# Patient Record
Sex: MALE | Race: WHITE | HISPANIC OR LATINO | Employment: FULL TIME | URBAN - METROPOLITAN AREA
[De-identification: names, ages, dates, MRNs, and addresses within clinical notes are randomized per-mention and may not be internally consistent; named-entity substitution may affect disease eponyms.]

---

## 2017-08-08 ENCOUNTER — ALLSCRIPTS OFFICE VISIT (OUTPATIENT)
Dept: OTHER | Facility: OTHER | Age: 57
End: 2017-08-08

## 2017-08-08 DIAGNOSIS — N40.2 NODULAR PROSTATE WITHOUT LOWER URINARY TRACT SYMPTOMS: ICD-10-CM

## 2017-08-08 LAB
BILIRUB UR QL STRIP: NORMAL
CLARITY UR: NORMAL
COLOR UR: YELLOW
GLUCOSE (HISTORICAL): NORMAL
HGB UR QL STRIP.AUTO: NORMAL
KETONES UR STRIP-MCNC: NORMAL MG/DL
LEUKOCYTE ESTERASE UR QL STRIP: NORMAL
NITRITE UR QL STRIP: NORMAL
PH UR STRIP.AUTO: 5.5 [PH]
PROT UR STRIP-MCNC: NORMAL MG/DL
SP GR UR STRIP.AUTO: 1.02
UROBILINOGEN UR QL STRIP.AUTO: 0.2

## 2017-09-19 ENCOUNTER — ALLSCRIPTS OFFICE VISIT (OUTPATIENT)
Dept: OTHER | Facility: OTHER | Age: 57
End: 2017-09-19

## 2017-09-19 DIAGNOSIS — N41.0 ACUTE PROSTATITIS: ICD-10-CM

## 2017-09-19 LAB
BILIRUB UR QL STRIP: NEGATIVE
CLARITY UR: NORMAL
COLOR UR: YELLOW
GLUCOSE (HISTORICAL): NEGATIVE
HGB UR QL STRIP.AUTO: NEGATIVE
KETONES UR STRIP-MCNC: NEGATIVE MG/DL
LEUKOCYTE ESTERASE UR QL STRIP: NEGATIVE
NITRITE UR QL STRIP: NEGATIVE
PH UR STRIP.AUTO: 5.5 [PH]
PROT UR STRIP-MCNC: NEGATIVE MG/DL
SP GR UR STRIP.AUTO: 1.01
UROBILINOGEN UR QL STRIP.AUTO: 0.2

## 2018-01-13 VITALS
WEIGHT: 159 LBS | SYSTOLIC BLOOD PRESSURE: 122 MMHG | BODY MASS INDEX: 25.55 KG/M2 | DIASTOLIC BLOOD PRESSURE: 60 MMHG | HEIGHT: 66 IN

## 2018-01-14 VITALS
BODY MASS INDEX: 25.07 KG/M2 | HEIGHT: 66 IN | WEIGHT: 156 LBS | DIASTOLIC BLOOD PRESSURE: 77 MMHG | SYSTOLIC BLOOD PRESSURE: 115 MMHG

## 2018-07-03 RX ORDER — OMEPRAZOLE 20 MG/1
20 CAPSULE, DELAYED RELEASE ORAL
COMMUNITY
Start: 2018-06-01 | End: 2019-06-01

## 2018-07-03 RX ORDER — TAMSULOSIN HYDROCHLORIDE 0.4 MG/1
1 CAPSULE ORAL
COMMUNITY
Start: 2017-08-08 | End: 2018-07-06 | Stop reason: SINTOL

## 2018-07-03 RX ORDER — CLOBETASOL PROPIONATE 0.5 MG/G
CREAM TOPICAL
COMMUNITY

## 2018-07-06 ENCOUNTER — OFFICE VISIT (OUTPATIENT)
Dept: UROLOGY | Facility: MEDICAL CENTER | Age: 58
End: 2018-07-06
Payer: COMMERCIAL

## 2018-07-06 VITALS
BODY MASS INDEX: 26.2 KG/M2 | DIASTOLIC BLOOD PRESSURE: 66 MMHG | HEIGHT: 66 IN | WEIGHT: 163 LBS | SYSTOLIC BLOOD PRESSURE: 128 MMHG

## 2018-07-06 DIAGNOSIS — N13.8 BPH WITH OBSTRUCTION/LOWER URINARY TRACT SYMPTOMS: ICD-10-CM

## 2018-07-06 DIAGNOSIS — R10.30 LOWER ABDOMINAL PAIN: ICD-10-CM

## 2018-07-06 DIAGNOSIS — N40.0 BPH WITHOUT URINARY OBSTRUCTION: Primary | ICD-10-CM

## 2018-07-06 DIAGNOSIS — N40.1 BPH WITH OBSTRUCTION/LOWER URINARY TRACT SYMPTOMS: ICD-10-CM

## 2018-07-06 LAB
SL AMB  POCT GLUCOSE, UA: NEGATIVE
SL AMB LEUKOCYTE ESTERASE,UA: NEGATIVE
SL AMB POCT BILIRUBIN,UA: NEGATIVE
SL AMB POCT BLOOD,UA: NEGATIVE
SL AMB POCT CLARITY,UA: CLEAR
SL AMB POCT COLOR,UA: YELLOW
SL AMB POCT KETONES,UA: NEGATIVE
SL AMB POCT NITRITE,UA: NEGATIVE
SL AMB POCT PH,UA: 5.5
SL AMB POCT SPECIFIC GRAVITY,UA: >=1.03
SL AMB POCT URINE PROTEIN: NEGATIVE
SL AMB POCT UROBILINOGEN: 0.2

## 2018-07-06 PROCEDURE — 81003 URINALYSIS AUTO W/O SCOPE: CPT | Performed by: UROLOGY

## 2018-07-06 PROCEDURE — 99214 OFFICE O/P EST MOD 30 MIN: CPT | Performed by: UROLOGY

## 2018-07-06 RX ORDER — FINASTERIDE 5 MG/1
5 TABLET, FILM COATED ORAL DAILY
Qty: 30 TABLET | Refills: 11 | Status: SHIPPED | OUTPATIENT
Start: 2018-07-06 | End: 2019-07-20 | Stop reason: SDUPTHER

## 2018-07-06 NOTE — LETTER
2018     Stacie Cooley MD  [facility] [place] [address]    Patient: Harini Dobbins   YOB: 1960   Date of Visit: 2018       Dear Dr Ashvin Calhoun: Thank you for referring Harini Dobbins to me for evaluation  Below are my notes for this consultation  If you have questions, please do not hesitate to call me  I look forward to following your patient along with you  Sincerely,        Jarvis Salomon MD        CC: No Recipients  Jarvis Salomon MD  2018  9:22 AM  Sign at close encounter  [address] [facility]  [address], [address] [place] [phone]  www  [facility]  org      NAME: Eddi Betts  AGE: 63 y o  SEX: male  : 1960   MRN: [id number]    DATE: 2018  TIME: 8:59 AM    Assessment and Plan:  He describes pelvic pain in the form of bilateral groin pain, right greater than left, weak stream and urgency/ frequency  He also is describing problems with his legs when he gets to stand up in the morning in this may be related  I am going to order CT scan of the abdomen and pelvis and perform cystoscopy  There may be a neurogenic or orthopedic component to this  For his BPH with obstruction, and he does have an enlarged prostate with a median lobe component, we will prescribe Proscar 5 mg daily  I told him of the possibility of sexual side effects, although  these are extremely small  Follow-up approximately 4 weeks for cystoscopy and get CT scan in the meantime  Chief Complaint   No chief complaint on file  History of Present Illness   Established patient:  Here for follow-up of  " prostate nodule "Found on Renal and bladder US for bilateral , then confirmed as" suspicious for neoplasm" midline nonenhancing nodule 1,6cm No PIRADDS # assigned  No MARY done or PSA per pt prior to that first visit    I did a prostate exam last year which revealed a 60 g gland with no nodules  Brenda Wolff MRI reviwed by me personally-large prostate with median lobe, I see only BPH  PSA 2 1, T=334  I had ordered a renal ultrasound which does not look like it is been done, and I treated him for acute prostatitis last visit with Cipro  Cipro did not help either  BPH with onstruction-Eddi Betts is a 65year-old male established patient who is here for symptoms of enlarged prostate slow stream, nocturia q1-2hrs  Started 1 5 yrs ago  Also gets some left lower quadrant pain, which he has had at times for 8 years- was told he may have diverticulitis  has voiding discomfort at night as well  Flomax didn't help- has pain with voiding  Voiding frequently wth weak stream     Pelvic pain- as above He is worse than he was before- has bilateral groin pain, right worse than left, and is voiding frequently, small amounts  Cannot have relations with his wife due to this  The following portions of the patient's history were reviewed and updated as appropriate: allergies, current medications, past family history, past medical history, past social history, past surgical history and problem list     Review of Systems   Review of Systems   Genitourinary: Positive for difficulty urinating  Active Problem List   There is no problem list on file for this patient  Objective   There were no vitals taken for this visit  Physical Exam   Constitutional: He is oriented to person, place, and time  He appears well-developed and well-nourished  HENT:   Head: Normocephalic and atraumatic  Eyes: EOM are normal    Neck: Normal range of motion  Pulmonary/Chest: Effort normal    Musculoskeletal: Normal range of motion  Neurological: He is alert and oriented to person, place, and time  Skin: Skin is warm and dry  Psychiatric: He has a normal mood and affect   His behavior is normal  Judgment and thought content normal            Current Medications     Current Outpatient Prescriptions:     omeprazole (PRILOSEC) 20 mg delayed release capsule, Take 20 mg by mouth, Disp: , Rfl:     tamsulosin (FLOMAX) 0 4 mg, Take 1 capsule by mouth, Disp: , Rfl:     clobetasol (TEMOVATE) 0 05 % cream, clobetasol 0 05 % topical cream  APPLY A THIN LAYER TO THE AFFECTED AREA(S) BY TOPICAL ROUTE 2 TIMES PER DAY, Disp: , Rfl:         Luis Noonan MD

## 2018-07-06 NOTE — PROGRESS NOTES
[address] [facility]  [facility]  [address] [facility] [place]  [place], [address] [facility]  [phone]  www  [facility]  org      NAME: Eddi Betts  AGE: 63 y o  SEX: male  : 1960   MRN: [id number]    DATE: 2018  TIME: 8:59 AM    Assessment and Plan:  He describes pelvic pain in the form of bilateral groin pain, right greater than left, weak stream and urgency/ frequency  He also is describing problems with his legs when he gets to stand up in the morning in this may be related  I am going to order CT scan of the abdomen and pelvis and perform cystoscopy  There may be a neurogenic or orthopedic component to this  For his BPH with obstruction, and he does have an enlarged prostate with a median lobe component, we will prescribe Proscar 5 mg daily  I told him of the possibility of sexual side effects, although  these are extremely small  Follow-up approximately 4 weeks for cystoscopy and get CT scan in the meantime  Chief Complaint   No chief complaint on file  History of Present Illness   Established patient:  Here for follow-up of  " prostate nodule "Found on Renal and bladder US for bilateral , then confirmed as" suspicious for neoplasm" midline nonenhancing nodule 1,6cm No PIRADDS # assigned  No MARY done or PSA per pt prior to that first visit  I did a prostate exam last year which revealed a 60 g gland with no nodules  Lan Grimaldo MRI reviwed by me personally-large prostate with median lobe, I see only BPH  PSA 2 1, T=334  I had ordered a renal ultrasound which does not look like it is been done, and I treated him for acute prostatitis last visit with Cipro  Cipro did not help either  BPH with onstruction-Eddi Betts is a 65year-old male established patient who is here for symptoms of enlarged prostate slow stream, nocturia q1-2hrs  Started 1 5 yrs ago   Also gets some left lower quadrant pain, which he has had at times for 8 years- was told he may have diverticulitis  has voiding discomfort at night as well  Flomax didn't help- has pain with voiding  Voiding frequently wth weak stream     Pelvic pain- as above He is worse than he was before- has bilateral groin pain, right worse than left, and is voiding frequently, small amounts  Cannot have relations with his wife due to this  The following portions of the patient's history were reviewed and updated as appropriate: allergies, current medications, past family history, past medical history, past social history, past surgical history and problem list     Review of Systems   Review of Systems   Genitourinary: Positive for difficulty urinating  Active Problem List   There is no problem list on file for this patient  Objective   There were no vitals taken for this visit  Physical Exam   Constitutional: He is oriented to person, place, and time  He appears well-developed and well-nourished  HENT:   Head: Normocephalic and atraumatic  Eyes: EOM are normal    Neck: Normal range of motion  Pulmonary/Chest: Effort normal    Musculoskeletal: Normal range of motion  Neurological: He is alert and oriented to person, place, and time  Skin: Skin is warm and dry  Psychiatric: He has a normal mood and affect   His behavior is normal  Judgment and thought content normal            Current Medications     Current Outpatient Prescriptions:     omeprazole (PRILOSEC) 20 mg delayed release capsule, Take 20 mg by mouth, Disp: , Rfl:     tamsulosin (FLOMAX) 0 4 mg, Take 1 capsule by mouth, Disp: , Rfl:     clobetasol (TEMOVATE) 0 05 % cream, clobetasol 0 05 % topical cream  APPLY A THIN LAYER TO THE AFFECTED AREA(S) BY TOPICAL ROUTE 2 TIMES PER DAY, Disp: , Rfl:         Zulma Hartley MD

## 2018-07-14 ENCOUNTER — HOSPITAL ENCOUNTER (OUTPATIENT)
Dept: CT IMAGING | Facility: HOSPITAL | Age: 58
Discharge: HOME | End: 2018-07-14
Attending: UROLOGY
Payer: COMMERCIAL

## 2018-07-14 DIAGNOSIS — R10.30 LOWER ABDOMINAL PAIN: ICD-10-CM

## 2018-07-14 PROCEDURE — 74178 CT ABD&PLV WO CNTR FLWD CNTR: CPT

## 2018-07-14 RX ADMIN — IOHEXOL 100 ML: 350 INJECTION, SOLUTION INTRAVENOUS at 09:52

## 2018-07-31 RX ORDER — TRAMADOL HYDROCHLORIDE 50 MG/1
TABLET ORAL EVERY 12 HOURS
COMMUNITY
End: 2021-09-24

## 2018-07-31 RX ORDER — ATORVASTATIN CALCIUM 10 MG/1
10 TABLET, FILM COATED ORAL
COMMUNITY
Start: 2018-07-13 | End: 2021-09-24

## 2018-07-31 RX ORDER — IBUPROFEN 200 MG
950 CAPSULE ORAL
COMMUNITY
Start: 2018-07-13 | End: 2021-09-24

## 2018-07-31 RX ORDER — ERGOCALCIFEROL (VITAMIN D2) 1250 MCG
50000 CAPSULE ORAL
COMMUNITY
Start: 2018-07-13 | End: 2019-07-13

## 2018-07-31 RX ORDER — TAMSULOSIN HYDROCHLORIDE 0.4 MG/1
0.4 CAPSULE ORAL
COMMUNITY
End: 2019-02-07 | Stop reason: SDUPTHER

## 2018-08-03 ENCOUNTER — PROCEDURE VISIT (OUTPATIENT)
Dept: UROLOGY | Facility: MEDICAL CENTER | Age: 58
End: 2018-08-03
Payer: COMMERCIAL

## 2018-08-03 VITALS
SYSTOLIC BLOOD PRESSURE: 110 MMHG | WEIGHT: 162 LBS | BODY MASS INDEX: 26.03 KG/M2 | DIASTOLIC BLOOD PRESSURE: 76 MMHG | HEIGHT: 66 IN

## 2018-08-03 DIAGNOSIS — R10.30 LOWER ABDOMINAL PAIN: Primary | ICD-10-CM

## 2018-08-03 DIAGNOSIS — N13.8 BPH WITH OBSTRUCTION/LOWER URINARY TRACT SYMPTOMS: ICD-10-CM

## 2018-08-03 DIAGNOSIS — N40.1 BPH WITH OBSTRUCTION/LOWER URINARY TRACT SYMPTOMS: ICD-10-CM

## 2018-08-03 LAB
SL AMB  POCT GLUCOSE, UA: NEGATIVE
SL AMB LEUKOCYTE ESTERASE,UA: NEGATIVE
SL AMB POCT BILIRUBIN,UA: NEGATIVE
SL AMB POCT BLOOD,UA: NEGATIVE
SL AMB POCT CLARITY,UA: CLEAR
SL AMB POCT COLOR,UA: YELLOW
SL AMB POCT KETONES,UA: NEGATIVE
SL AMB POCT NITRITE,UA: NEGATIVE
SL AMB POCT PH,UA: 5.5
SL AMB POCT SPECIFIC GRAVITY,UA: 1.02
SL AMB POCT URINE PROTEIN: NEGATIVE
SL AMB POCT UROBILINOGEN: 0.2

## 2018-08-03 PROCEDURE — 99212 OFFICE O/P EST SF 10 MIN: CPT | Performed by: UROLOGY

## 2018-08-03 PROCEDURE — 81003 URINALYSIS AUTO W/O SCOPE: CPT | Performed by: UROLOGY

## 2018-08-03 PROCEDURE — 52000 CYSTOURETHROSCOPY: CPT | Performed by: UROLOGY

## 2018-08-03 RX ORDER — TAMSULOSIN HYDROCHLORIDE 0.4 MG/1
0.4 CAPSULE ORAL
Qty: 90 CAPSULE | Refills: 3 | Status: SHIPPED | OUTPATIENT
Start: 2018-08-03 | End: 2019-02-07 | Stop reason: SDUPTHER

## 2018-08-03 NOTE — PROGRESS NOTES
[address] [facility]  [facility]  [address] [facility] [place]  [place], [address] [facility]  [phone]  www  [facility]  org      NAME: Eddi Betts  AGE: 63 y o  SEX: male  : 1960   MRN: [id number]    DATE: 8/3/2018  TIME: 10:47 AM    Assessment and Plan               Chief Complaint     Chief Complaint   Patient presents with    Cystoscopy       History of Present Illness   Here for cystoscopy due to weak stream and urgency and frequency  CT scan is negative  The following portions of the patient's history were reviewed and updated as appropriate: allergies, current medications, past family history, past medical history, past social history, past surgical history and problem list     Review of Systems   Review of Systems    Active Problem List   There is no problem list on file for this patient        Objective   /76   Ht 5' 6" (1 676 m)   Wt 73 5 kg (162 lb)   BMI 26 15 kg/m²     Physical Exam        Current Medications     Current Outpatient Prescriptions:     atorvastatin (LIPITOR) 10 mg tablet, Take 10 mg by mouth, Disp: , Rfl:     calcium citrate (CALCITRATE) 950 MG tablet, Take 950 mg by mouth, Disp: , Rfl:     clobetasol (TEMOVATE) 0 05 % cream, clobetasol 0 05 % topical cream  APPLY A THIN LAYER TO THE AFFECTED AREA(S) BY TOPICAL ROUTE 2 TIMES PER DAY, Disp: , Rfl:     finasteride (PROSCAR) 5 mg tablet, Take 1 tablet (5 mg total) by mouth daily, Disp: 30 tablet, Rfl: 11    omeprazole (PRILOSEC) 20 mg delayed release capsule, Take 20 mg by mouth, Disp: , Rfl:     traMADol (ULTRAM) 50 mg tablet, Every 12 hours, Disp: , Rfl:     ergocalciferol (ERGOCALCIFEROL) 26291 units capsule, Take 50,000 Units by mouth, Disp: , Rfl:     tamsulosin (FLOMAX) 0 4 mg, Take 0 4 mg by mouth, Disp: , Rfl:         Karthikeyan Dangelo MD

## 2018-08-03 NOTE — LETTER
August 3, 2018     Ryan Natarajan MD  [facility]  [facility] [address]    Patient: Alyson Locke   YOB: 1960   Date of Visit: 8/3/2018       Dear Dr Minerva Mary: Thank you for referring Alyson Ip to me for evaluation  Below are my notes for this consultation  If you have questions, please do not hesitate to call me  I look forward to following your patient along with you           Sincerely,        Rikki Hernandez MD        CC: No Recipients

## 2018-08-06 ENCOUNTER — TELEPHONE (OUTPATIENT)
Dept: UROLOGY | Facility: MEDICAL CENTER | Age: 58
End: 2018-08-06

## 2018-08-06 NOTE — TELEPHONE ENCOUNTER
Patient's stepdaughter called  Patient had a reaction to Tamsulosin 0 4 mg  He got a very bad headache and had difficulty breathing, so he stopped it  His last dosage was Saturday  Patient is requesting a different medication  He only speaks [place]  If someone speaks [place], please call him at [phone]  If not, please call Soco (his stepdaughter) at [phone]

## 2018-08-07 NOTE — TELEPHONE ENCOUNTER
Tell him to continue with the Proscar-it will take several weeks for a to begin taking effect but that will help him

## 2018-08-07 NOTE — PROGRESS NOTES
Addendum to patient's visit 8/3/2018-cystoscopy was performed at that time-  Cystoscopy Procedure Note        Pre-operative Diagnosis:  BPH with obstruction and lower urinary tract symptoms    Post-operative Diagnosis:  Overactive bladder, mild BPH    Procedure: Flexible cystoscopy    Surgeon: Elvis Arce MD    Anesthesia: 1% Xylocaine per urethra    EBL: Minimal    Complications: none    Procedure Details   The risks, benefits, complications, treatment options, and expected outcomes were discussed with the patient  The patient concurred with the proposed plan, giving informed consent  Cystoscopy was performed today under local anesthesia, using sterile technique  The patient was placed in the supine position, prepped with Betadine, and draped in the usual sterile fashion  The flexible cystocope was used to inspect both the urethra and bladder    Findings:  Urethra:  Normal without stricture  Prostate shows mild-to-moderate obstruction  Bladder:  Smooth, not trabeculated and there were no stones tumors or other lesions  The orifices were orthotopic and intact  Specimens:  None                 Complications:  None           Disposition: To home     Plan:  Start Proscar 5 mg daily because he does not tolerate the Flomax very well  Follow-up in 6 months to reassess             Condition:  Stable

## 2018-08-08 DIAGNOSIS — N13.8 BPH WITH OBSTRUCTION/LOWER URINARY TRACT SYMPTOMS: Primary | ICD-10-CM

## 2018-08-08 DIAGNOSIS — N40.1 BPH WITH OBSTRUCTION/LOWER URINARY TRACT SYMPTOMS: Primary | ICD-10-CM

## 2018-08-08 RX ORDER — SILODOSIN 8 MG/1
8 CAPSULE ORAL DAILY
Qty: 30 CAPSULE | Refills: 11 | Status: SHIPPED | OUTPATIENT
Start: 2018-08-08 | End: 2019-01-08 | Stop reason: ALTCHOICE

## 2018-08-08 NOTE — TELEPHONE ENCOUNTER
Spoke with step daughter and gave her message  Wife would like to know if he can go back on Rapaflo also  Sending to Dr Sylvia Morlaes for orders  Thanks

## 2018-08-08 NOTE — TELEPHONE ENCOUNTER
Spoke with step-daughter who said life would like pt to have RX for Rapaflo sent to pharmacy in computer  Thanks

## 2018-12-31 ENCOUNTER — TELEPHONE (OUTPATIENT)
Dept: UROLOGY | Facility: AMBULATORY SURGERY CENTER | Age: 58
End: 2018-12-31

## 2018-12-31 NOTE — TELEPHONE ENCOUNTER
Spoke with wife who said pt is c/o supra pubic pain and pain in feet  Told her he should go to ER if in a lot of pain as the note says  She said he can wait to see Dr Pooja Adams next available next week  Appt given at that time

## 2019-01-08 ENCOUNTER — OFFICE VISIT (OUTPATIENT)
Dept: UROLOGY | Facility: MEDICAL CENTER | Age: 59
End: 2019-01-08
Payer: COMMERCIAL

## 2019-01-08 VITALS
HEIGHT: 66 IN | BODY MASS INDEX: 25.55 KG/M2 | SYSTOLIC BLOOD PRESSURE: 120 MMHG | DIASTOLIC BLOOD PRESSURE: 70 MMHG | WEIGHT: 159 LBS | HEART RATE: 60 BPM

## 2019-01-08 DIAGNOSIS — N52.1 ERECTILE DYSFUNCTION DUE TO DISEASES CLASSIFIED ELSEWHERE: ICD-10-CM

## 2019-01-08 DIAGNOSIS — N13.8 BPH WITH OBSTRUCTION/LOWER URINARY TRACT SYMPTOMS: ICD-10-CM

## 2019-01-08 DIAGNOSIS — N40.1 BPH WITH OBSTRUCTION/LOWER URINARY TRACT SYMPTOMS: ICD-10-CM

## 2019-01-08 DIAGNOSIS — R10.2 PELVIC PAIN: Primary | ICD-10-CM

## 2019-01-08 PROCEDURE — 99214 OFFICE O/P EST MOD 30 MIN: CPT | Performed by: UROLOGY

## 2019-01-08 RX ORDER — TAMSULOSIN HYDROCHLORIDE 0.4 MG/1
0.4 CAPSULE ORAL
Qty: 30 CAPSULE | Refills: 11 | Status: SHIPPED | OUTPATIENT
Start: 2019-01-08 | End: 2020-01-22

## 2019-01-08 RX ORDER — MELOXICAM 7.5 MG/1
TABLET ORAL
COMMUNITY

## 2019-01-08 RX ORDER — SILDENAFIL CITRATE 20 MG/1
20 TABLET ORAL DAILY PRN
Qty: 90 TABLET | Refills: 3 | Status: SHIPPED | OUTPATIENT
Start: 2019-01-08 | End: 2020-12-08 | Stop reason: DRUGHIGH

## 2019-01-08 NOTE — LETTER
2019     Prachi Bearden MD [address]  [facility] [address]    Patient: Shanna Flores   YOB: 1960   Date of Visit: 2019       Dear Dr Nancy Bowers: Thank you for referring Shanna Flores to me for evaluation  Below are my notes for this consultation  If you have questions, please do not hesitate to call me  I look forward to following your patient along with you  Sincerely,        Peewee Jean MD        CC: No Recipients  Peewee Jean MD  2019  9:23 AM  Sign at close encounter  [address] [facility]  [address], [address] [place] [phone]  www  [facility]  org      NAME: Eddi Betts  AGE: 63 y o  SEX: male  : 1960   MRN: [id number]    DATE: 2019  TIME: 8:30 AM    Assessment and Plan:    Chronic abdominal pain, for which he has seen Gastroenterology in the past and I have no easy answer for  BPH with obstruction:  Continue with Rapaflo and Proscar  He would like to try Flomax instead of the Rapaflo  We will prescribe this  Erectile dysfunction:  Prescribe sildenafil to mail order pharmacy  Instructions given  Re-evaluate in 3 months  Chief Complaint   No chief complaint on file  History of Present Illness   Here for follow-up of pelvic pain in the form of bilateral groin pain right greater than left weak stream and urgency and frequency  Since I last saw him he requested to go on Rapaflo we prescribed this for him  Cystoscopy showed a tight bladder neck and some BPH with large median lobe  CT scan was negative  He complains of left lower quadrant pain radiating over to his right testicle  Erectile dysfunction:  He would like to try Viagra      The following portions of the patient's history were reviewed and updated as appropriate: allergies, current medications, past family history, past medical history, past social history, past surgical history and problem list     Review of Systems   Review of Systems    Active Problem List   There is no problem list on file for this patient  Objective   There were no vitals taken for this visit      Physical Exam        Current Medications     Current Outpatient Prescriptions:     atorvastatin (LIPITOR) 10 mg tablet, Take 10 mg by mouth, Disp: , Rfl:     calcium citrate (CALCITRATE) 950 MG tablet, Take 950 mg by mouth, Disp: , Rfl:     clobetasol (TEMOVATE) 0 05 % cream, clobetasol 0 05 % topical cream  APPLY A THIN LAYER TO THE AFFECTED AREA(S) BY TOPICAL ROUTE 2 TIMES PER DAY, Disp: , Rfl:     ergocalciferol (ERGOCALCIFEROL) 74293 units capsule, Take 50,000 Units by mouth, Disp: , Rfl:     finasteride (PROSCAR) 5 mg tablet, Take 1 tablet (5 mg total) by mouth daily, Disp: 30 tablet, Rfl: 11    omeprazole (PRILOSEC) 20 mg delayed release capsule, Take 20 mg by mouth, Disp: , Rfl:     Silodosin (RAPAFLO) 8 MG CAPS, Take 1 capsule by mouth daily, Disp: 30 capsule, Rfl: 11    tamsulosin (FLOMAX) 0 4 mg, Take 0 4 mg by mouth, Disp: , Rfl:     tamsulosin (FLOMAX) 0 4 mg, Take 1 capsule (0 4 mg total) by mouth daily with dinner, Disp: 90 capsule, Rfl: 3    traMADol (ULTRAM) 50 mg tablet, Every 12 hours, Disp: , Rfl:         Sophie Keller MD

## 2019-01-08 NOTE — PROGRESS NOTES
[address] [facility]  [facility]  [address] [facility], [address] [place] [address]  [phone]  www  [facility]  org      NAME: Eddi Betts  AGE: 63 y o  SEX: male  : 1960   MRN: [id number]    DATE: 2019  TIME: 8:30 AM    Assessment and Plan:    Chronic abdominal pain, for which he has seen Gastroenterology in the past and I have no easy answer for  BPH with obstruction:  Continue with Rapaflo and Proscar  He would like to try Flomax instead of the Rapaflo  We will prescribe this  Erectile dysfunction:  Prescribe sildenafil to mail order pharmacy  Instructions given  Re-evaluate in 3 months  Chief Complaint   No chief complaint on file  History of Present Illness   Here for follow-up of pelvic pain in the form of bilateral groin pain right greater than left weak stream and urgency and frequency  Since I last saw him he requested to go on Rapaflo we prescribed this for him  Cystoscopy showed a tight bladder neck and some BPH with large median lobe  CT scan was negative  He complains of left lower quadrant pain radiating over to his right testicle  Erectile dysfunction:  He would like to try Viagra  The following portions of the patient's history were reviewed and updated as appropriate: allergies, current medications, past family history, past medical history, past social history, past surgical history and problem list     Review of Systems   Review of Systems   Genitourinary: Negative  Active Problem List   There is no problem list on file for this patient  Objective   There were no vitals taken for this visit  Physical Exam   Constitutional: He is oriented to person, place, and time  He appears well-developed and well-nourished  HENT:   Head: Normocephalic and atraumatic  Eyes: EOM are normal    Neck: Normal range of motion     Pulmonary/Chest: Effort normal    Musculoskeletal: Normal range of motion  Neurological: He is alert and oriented to person, place, and time  Skin: Skin is warm and dry  Psychiatric: He has a normal mood and affect   His behavior is normal  Judgment and thought content normal            Current Medications     Current Outpatient Prescriptions:     atorvastatin (LIPITOR) 10 mg tablet, Take 10 mg by mouth, Disp: , Rfl:     calcium citrate (CALCITRATE) 950 MG tablet, Take 950 mg by mouth, Disp: , Rfl:     clobetasol (TEMOVATE) 0 05 % cream, clobetasol 0 05 % topical cream  APPLY A THIN LAYER TO THE AFFECTED AREA(S) BY TOPICAL ROUTE 2 TIMES PER DAY, Disp: , Rfl:     ergocalciferol (ERGOCALCIFEROL) 32742 units capsule, Take 50,000 Units by mouth, Disp: , Rfl:     finasteride (PROSCAR) 5 mg tablet, Take 1 tablet (5 mg total) by mouth daily, Disp: 30 tablet, Rfl: 11    omeprazole (PRILOSEC) 20 mg delayed release capsule, Take 20 mg by mouth, Disp: , Rfl:     Silodosin (RAPAFLO) 8 MG CAPS, Take 1 capsule by mouth daily, Disp: 30 capsule, Rfl: 11    tamsulosin (FLOMAX) 0 4 mg, Take 0 4 mg by mouth, Disp: , Rfl:     tamsulosin (FLOMAX) 0 4 mg, Take 1 capsule (0 4 mg total) by mouth daily with dinner, Disp: 90 capsule, Rfl: 3    traMADol (ULTRAM) 50 mg tablet, Every 12 hours, Disp: , Rfl:         Ashish Brand MD

## 2019-04-08 ENCOUNTER — OFFICE VISIT (OUTPATIENT)
Dept: UROLOGY | Facility: MEDICAL CENTER | Age: 59
End: 2019-04-08
Payer: COMMERCIAL

## 2019-04-08 VITALS
HEIGHT: 66 IN | BODY MASS INDEX: 25.55 KG/M2 | SYSTOLIC BLOOD PRESSURE: 120 MMHG | WEIGHT: 159 LBS | DIASTOLIC BLOOD PRESSURE: 80 MMHG | HEART RATE: 77 BPM

## 2019-04-08 DIAGNOSIS — N40.1 BPH WITH OBSTRUCTION/LOWER URINARY TRACT SYMPTOMS: Primary | ICD-10-CM

## 2019-04-08 DIAGNOSIS — N13.8 BPH WITH OBSTRUCTION/LOWER URINARY TRACT SYMPTOMS: Primary | ICD-10-CM

## 2019-04-08 DIAGNOSIS — Z12.5 ENCOUNTER FOR PROSTATE CANCER SCREENING: ICD-10-CM

## 2019-04-08 DIAGNOSIS — N52.9 ERECTILE DYSFUNCTION, UNSPECIFIED ERECTILE DYSFUNCTION TYPE: ICD-10-CM

## 2019-04-08 LAB
SL AMB  POCT GLUCOSE, UA: NORMAL
SL AMB LEUKOCYTE ESTERASE,UA: NORMAL
SL AMB POCT BILIRUBIN,UA: NORMAL
SL AMB POCT BLOOD,UA: NORMAL
SL AMB POCT CLARITY,UA: CLEAR
SL AMB POCT COLOR,UA: YELLOW
SL AMB POCT KETONES,UA: NORMAL
SL AMB POCT NITRITE,UA: NORMAL
SL AMB POCT PH,UA: 5.5
SL AMB POCT SPECIFIC GRAVITY,UA: 1.02
SL AMB POCT URINE PROTEIN: NORMAL
SL AMB POCT UROBILINOGEN: 0.2

## 2019-04-08 PROCEDURE — 99213 OFFICE O/P EST LOW 20 MIN: CPT | Performed by: UROLOGY

## 2019-04-08 PROCEDURE — 81003 URINALYSIS AUTO W/O SCOPE: CPT | Performed by: UROLOGY

## 2019-04-08 RX ORDER — SILDENAFIL 50 MG/1
100 TABLET, FILM COATED ORAL DAILY PRN
Qty: 20 TABLET | Refills: 3 | Status: SHIPPED | OUTPATIENT
Start: 2019-04-08

## 2019-07-20 DIAGNOSIS — N40.1 BPH WITH OBSTRUCTION/LOWER URINARY TRACT SYMPTOMS: ICD-10-CM

## 2019-07-20 DIAGNOSIS — N13.8 BPH WITH OBSTRUCTION/LOWER URINARY TRACT SYMPTOMS: ICD-10-CM

## 2019-07-22 RX ORDER — FINASTERIDE 5 MG/1
TABLET, FILM COATED ORAL
Qty: 30 TABLET | Refills: 10 | Status: SHIPPED | OUTPATIENT
Start: 2019-07-22 | End: 2020-05-15 | Stop reason: SDUPTHER

## 2020-01-22 DIAGNOSIS — N40.1 BPH WITH OBSTRUCTION/LOWER URINARY TRACT SYMPTOMS: ICD-10-CM

## 2020-01-22 DIAGNOSIS — N13.8 BPH WITH OBSTRUCTION/LOWER URINARY TRACT SYMPTOMS: ICD-10-CM

## 2020-01-22 RX ORDER — TAMSULOSIN HYDROCHLORIDE 0.4 MG/1
CAPSULE ORAL
Qty: 30 CAPSULE | Refills: 0 | Status: SHIPPED | OUTPATIENT
Start: 2020-01-22 | End: 2020-05-20 | Stop reason: SDUPTHER

## 2020-05-15 DIAGNOSIS — N40.1 BPH WITH OBSTRUCTION/LOWER URINARY TRACT SYMPTOMS: ICD-10-CM

## 2020-05-15 DIAGNOSIS — N13.8 BPH WITH OBSTRUCTION/LOWER URINARY TRACT SYMPTOMS: ICD-10-CM

## 2020-05-15 RX ORDER — FINASTERIDE 5 MG/1
5 TABLET, FILM COATED ORAL DAILY
Qty: 90 TABLET | Refills: 0 | Status: SHIPPED | OUTPATIENT
Start: 2020-05-15 | End: 2021-05-11 | Stop reason: SDUPTHER

## 2020-05-20 RX ORDER — TAMSULOSIN HYDROCHLORIDE 0.4 MG/1
0.4 CAPSULE ORAL
Qty: 90 CAPSULE | Refills: 0 | Status: SHIPPED | OUTPATIENT
Start: 2020-05-20 | End: 2020-09-04 | Stop reason: SDUPTHER

## 2020-09-04 DIAGNOSIS — N13.8 BPH WITH OBSTRUCTION/LOWER URINARY TRACT SYMPTOMS: ICD-10-CM

## 2020-09-04 DIAGNOSIS — N40.1 BPH WITH OBSTRUCTION/LOWER URINARY TRACT SYMPTOMS: ICD-10-CM

## 2020-09-04 RX ORDER — TAMSULOSIN HYDROCHLORIDE 0.4 MG/1
0.4 CAPSULE ORAL
Qty: 90 CAPSULE | Refills: 0 | Status: SHIPPED | OUTPATIENT
Start: 2020-09-04 | End: 2020-12-08 | Stop reason: SDUPTHER

## 2020-09-04 NOTE — TELEPHONE ENCOUNTER
Medication Refill Request [facility]    Patient calling to request a refill of tamsulosin (FLOMAX) 0 4 mg    to be sent to: [address] & [facility], [address] [facility]  Phone:  [phone]    Patient can be reached at: [phone]

## 2020-09-04 NOTE — TELEPHONE ENCOUNTER
The patient has an upcoming office visit scheduled for 9/28/20 with Dr Maxwell Sandoval in the [facility] location but will run out of medication until then    Request for same, 90 day supply with NO refills was queued and forwarded to the Advanced Practitioner covering the [facility] location for approval

## 2020-09-18 ENCOUNTER — TELEPHONE (OUTPATIENT)
Dept: UROLOGY | Facility: MEDICAL CENTER | Age: 60
End: 2020-09-18

## 2020-12-07 DIAGNOSIS — N40.1 BPH WITH OBSTRUCTION/LOWER URINARY TRACT SYMPTOMS: ICD-10-CM

## 2020-12-07 DIAGNOSIS — N13.8 BPH WITH OBSTRUCTION/LOWER URINARY TRACT SYMPTOMS: ICD-10-CM

## 2020-12-08 RX ORDER — TAMSULOSIN HYDROCHLORIDE 0.4 MG/1
0.4 CAPSULE ORAL
Qty: 90 CAPSULE | Refills: 0 | Status: SHIPPED | OUTPATIENT
Start: 2020-12-08 | End: 2021-05-11

## 2021-01-25 ENCOUNTER — TELEPHONE (OUTPATIENT)
Dept: UROLOGY | Facility: MEDICAL CENTER | Age: 61
End: 2021-01-25

## 2021-02-02 ENCOUNTER — TELEPHONE (OUTPATIENT)
Dept: UROLOGY | Facility: MEDICAL CENTER | Age: 61
End: 2021-02-02

## 2021-02-02 NOTE — TELEPHONE ENCOUNTER
Call placed to patient and voicemail left for patient to make him aware that his visit will need to be switched to virtual with Dr Garcia today  Office contact information left for patient to call our office back and confirm

## 2021-02-10 ENCOUNTER — TELEPHONE (OUTPATIENT)
Dept: UROLOGY | Facility: MEDICAL CENTER | Age: 61
End: 2021-02-10

## 2021-02-10 ENCOUNTER — OFFICE VISIT (OUTPATIENT)
Dept: UROLOGY | Facility: MEDICAL CENTER | Age: 61
End: 2021-02-10
Payer: COMMERCIAL

## 2021-02-10 VITALS — HEIGHT: 66 IN | WEIGHT: 165 LBS | BODY MASS INDEX: 26.52 KG/M2

## 2021-02-10 DIAGNOSIS — N40.1 BPH WITH OBSTRUCTION/LOWER URINARY TRACT SYMPTOMS: Primary | ICD-10-CM

## 2021-02-10 DIAGNOSIS — N13.8 BPH WITH OBSTRUCTION/LOWER URINARY TRACT SYMPTOMS: Primary | ICD-10-CM

## 2021-02-10 DIAGNOSIS — N52.8 OTHER MALE ERECTILE DYSFUNCTION: ICD-10-CM

## 2021-02-10 DIAGNOSIS — Z71.89 OTHER SPECIFIED COUNSELING: ICD-10-CM

## 2021-02-10 LAB
SL AMB  POCT GLUCOSE, UA: NORMAL
SL AMB LEUKOCYTE ESTERASE,UA: NORMAL
SL AMB POCT BILIRUBIN,UA: NORMAL
SL AMB POCT BLOOD,UA: NORMAL
SL AMB POCT CLARITY,UA: CLEAR
SL AMB POCT COLOR,UA: YELLOW
SL AMB POCT KETONES,UA: NORMAL
SL AMB POCT NITRITE,UA: NORMAL
SL AMB POCT PH,UA: 6
SL AMB POCT SPECIFIC GRAVITY,UA: >=1.03
SL AMB POCT URINE PROTEIN: NORMAL
SL AMB POCT UROBILINOGEN: 0.2

## 2021-02-10 PROCEDURE — 81003 URINALYSIS AUTO W/O SCOPE: CPT | Performed by: UROLOGY

## 2021-02-10 PROCEDURE — 99203 OFFICE O/P NEW LOW 30 MIN: CPT | Performed by: UROLOGY

## 2021-02-10 RX ORDER — ALFUZOSIN HYDROCHLORIDE 10 MG/1
10 TABLET, EXTENDED RELEASE ORAL DAILY
Qty: 90 TABLET | Refills: 1 | Status: SHIPPED | OUTPATIENT
Start: 2021-02-10 | End: 2021-05-11

## 2021-02-10 NOTE — PROGRESS NOTES
Assessment/Plan:    Other male erectile dysfunction   Sildenafil is working  Continue it  BPH with obstruction/lower urinary tract symptoms   The patient has moderate- severe voiding symptoms despite taking tamsulosin and finasteride for some time  The tamsulosin is also giving him untoward side effects  At this point, we plan a cystoscopy since he may do much better       Diagnoses and all orders for this visit:    BPH with obstruction/lower urinary tract symptoms  -     POCT urine dip auto non-scope  -     alfuzosin (UROXATRAL) 10 mg 24 hr tablet; Take 1 tablet (10 mg total) by mouth daily    Other specified counseling    Other male erectile dysfunction          Subjective:      Patient ID: Maggie Richardson is a 62 y o  male  HPI     New patient visit  Translation provided by Stacy Lock  BPH:  He notes intermittency, weak stream and nocturia x 3  He denies other significant urinary symptoms  He denies gross hematuria, urinary tract infections or incontinence  He is taking tamsulosin and finasteride for his symptoms  He reports he does not feel well on tamsulosin and he does not think it is helping much  He is also having sexual problems form the meds  ElevatedPSA:  2 8 on December 21, 2020  The patient is taking finasteride, so his PSA should be less than 2  There are no other PSA's on record  PVR:  13 cc  AUA SYMPTOM SCORE      Most Recent Value   AUA SYMPTOM SCORE   How often have you had a sensation of not emptying your bladder completely after you finished urinating? 2   How often have you had to urinate again less than two hours after you finished urinating? 2   How often have you found you stopped and started again several times when you urinate? 3   How often have you found it difficult to postpone urination? 1   How often have you had a weak urinary stream?  2   How often have you had to push or strain to begin urination?   1   How many times did you most typically get up to urinate from the time you went to bed at night until the time you got up in the morning? 3   Quality of Life: If you were to spend the rest of your life with your urinary condition just the way it is now, how would you feel about that?  3   AUA SYMPTOM SCORE  14        Erectile dysfunction:  Sildenafil is working  Accompanied by wife  The following portions of the patient's history were reviewed and updated as appropriate: allergies, current medications, past family history, past medical history, past social history, past surgical history and problem list     Review of Systems   Constitutional: Negative for activity change and fatigue  Respiratory: Negative for shortness of breath and wheezing  Cardiovascular: Negative for chest pain  Gastrointestinal: Negative for abdominal pain  Genitourinary: Negative for difficulty urinating, dysuria, frequency, hematuria and urgency  Musculoskeletal: Negative for back pain and gait problem  Skin: Negative  Allergic/Immunologic: Negative  Neurological: Negative  Psychiatric/Behavioral: Negative  Objective:      Ht 5' 6" (1 676 m)   Wt 74 8 kg (165 lb)   BMI 26 63 kg/m²          Physical Exam  Constitutional:       Appearance: He is well-developed  HENT:      Head: Normocephalic and atraumatic  Neck:      Musculoskeletal: Normal range of motion and neck supple  Pulmonary:      Effort: Pulmonary effort is normal    Genitourinary:     Rectum: Normal       Comments: The prostate is 40 grams, firm, smooth and non-tender  Musculoskeletal: Normal range of motion  Skin:     General: Skin is warm and dry  Neurological:      Mental Status: He is alert and oriented to person, place, and time  Psychiatric:         Behavior: Behavior normal          Thought Content:  Thought content normal          Judgment: Judgment normal

## 2021-02-10 NOTE — ASSESSMENT & PLAN NOTE
The patient has moderate- severe voiding symptoms despite taking tamsulosin and finasteride for some time  The tamsulosin is also giving him untoward side effects    At this point, we plan a cystoscopy since he may do much better

## 2021-02-10 NOTE — TELEPHONE ENCOUNTER
Patient saw Dr Rand Goyal this morning  He would like the patient to see either Dr Marina Peña or Dr Moose Cantu in four weeks  Pt needs Niuean speaking MD who does urolift  Not sure where to put patient  He would prefer [facility] but will go to [facility] or [facility] if necessary  Thank you  If you need me to do anything else, let me know

## 2021-02-11 NOTE — TELEPHONE ENCOUNTER
Hi,    Can you please advise? Per note sent to me, Dr Harshil Eli would like the pt to see with Dr Mary Roque or Dr Marely Reddy (in 4wks for FU)  I checked both MD's schedule's and I did find an appt for the patient with Dr Marely Reddy on 3/19/21(5 wks), is this appt appropiate? Thank you!

## 2021-05-10 NOTE — PATIENT INSTRUCTIONS
Ayuda para la uriel de decisiones en casos de hiperplasia prostática benigna   LO QUE NECESITA SABER:   ¿Qué necesito saber acerca de la uriel de decisiones en los casos de hiperplasia prostática benigna (HPB)? Puede trabajar con eason médico para stephanie decisiones acerca de la detección o el tratamiento de la HPB  La detección es sweta prueba que se realiza para determinar la presencia de HPB de manera temprana  La detección es diferente del diagnóstico  La detección se [place] si usted está en riesgo incrementado de HPB panda no tiene síntomas  [place] implica que el control o el tratamiento pueden comenzar de Chio temprana  También puede ayudar a planificar el tratamiento si se determina la presencia de HPB en la detección o si esta se desarrolla luego  Silva opciones de tratamiento incluyen la cirugía y las opciones no quirúrgicas  Es probable que eason médico le recomiende los tratamientos no quirúrgicos starla  Conozca los beneficios y los riesgos de la [place] y del tratamiento no quirúrgico para que pueda stephanie sweta decisión informada  ¿Qué yo saber acerca de la HPB? · La HPB es un agrandamiento de la próstata  La próstata generalmente es del tamaño de Zigmund Jose Angel y envuelve la uretra  Un agrandamiento de la próstata hará que esta ejerza presión sobre la uretra  [place] puede causar problemas con el almacenamiento de la orina o la eliminación total de la orina  · La HPB es frecuente en hombres mayores de 40 años  El riesgo aumenta con la edad  · [place] significa que no es cáncer  La HPB no es sweta afección potencialmente mortal, panda puede causar problemas con sliva actividades diarias  La HPB generalmente empeora con el paso del [place]  Sin tratamiento, la HPB también puede provocar la presencia de alicia en la orina, cálculos en la vejiga o insuficiencia renal     ¿Soy un buen candidato para la detección de la HPB?  La detección puede ser útil para usted si algo de lo siguiente es verdadero:  · Tiene 36 años o más de edad     · Usted tiene antecedentes familiares de HPB u otros problemas de próstata  · Tiene sweta afección médica, jeanette sweta enfermedad cardíaca o diabetes tipo 2, o es ruthy  · No hace mucha actividad física  · Usted quiere recibir tratamiento tan pronto jeanette sea posible, si es necesario  · Uriel un medicamento betabloqueante  ¿Cómo se realiza la detección? Eason médico le preguntará sobre eason deja y silva antecedentes médicos y antecedentes familiares de problemas con la próstata  Si tiene mayor riesgo, eason médico puede usar cualquiera de los siguientes métodos para verificar la presencia de HPB:  · La Puntuación internacional de síntomas de la próstata (International Prostate Symptom Score) es un conjunto de preguntas sobre eason capacidad de orinar kayden el mes anterior  Se le preguntará con qué frecuencia presentó algo de lo que se detalla a continuación:     ? Orina 8 o más veces en un día    ? Sensación de no vaciar completamente la vejiga al orinar    ? Sweta necesidad urgente de orinar que no pudo posponer o de volver a orinar en un lapso de 2 horas    ? Despertarse debido a la necesidad de orinar    ? Problemas para comenzar a orinar o necesidad de empujar o esforzarse para comenzar a hacerlo    ? Flujo de orina que comienza y se detiene varias veces al orinar    ? [place] débil o goteo después de orinar    · Un tacto rectal se utiliza para comprobar el tamaño de la próstata  Eason médico introducirá un dedo cubierto con un guante en el recto  El médico podrá palpar la próstata  El examen se puede repetir con el paso del tiempo para verificar el tamaño de la próstata  · Un examen de APE se utiliza para determinar la cantidad de sweta proteína que la glándula prostática produce  Se uriel sweta muestra de alicia para stuart examen  Un nivel alto de APE puede aumentar el riesgo de padecer problemas más graves para orinar o la necesidad de someterse a [facility]      ¿Cuáles son Lavona Genin y los riesgos de la detección? Hable con esaon médico sobre los riesgos y los beneficios de la detección:  · Los beneficios incluyen el hallazgo de la HPB de Chio temprana  [place] significa que puede stephanie [employer] tratamientos  El examen de APE también puede detectar la presencia de cáncer de próstata de manera temprana  El tratamiento para el cáncer de próstata es más exitoso cuando se comienza de Chio temprana  · Los riesgos incluyen sweta falsa creencia de que no desarrollará HPB si el resultado de la detección es negativo  Aunque los resultados de la detección cory negativos, usted puede desarrollar HPB más adelante  También puede necesitar realizarse otros exámenes si tiene problemas para orinar, panda según la detección no tiene HPB  ¿Qué preguntas yo hacerle a mi médico para que me ayude a stephanie decisiones sobre la detección?  · ¿Qué riesgo presento de tener HPB? · ¿Con qué frecuencia yo realizarme la detección? · ¿Dónde se realiza la detección? · ¿Necesito hacer algo para estar listo para someterme a la detección?    ¿Qué sucede después de la detección? Usted se reunirá con eason médico para repasar los [employer] de la detección  [place] vez necesite más pruebas para diagnosticar cualquier problema que se haya observado en la detección  Las pruebas comunes incluyen un análisis de orina para detectar la presencia de infecciones o sweta biopsia (muestra de tejido) para detectar la presencia de cáncer  También puede necesitar exámenes para determinar la cantidad de orina que permanece en la vejiga después de orinar  Además, se puede medir la fuerza del flujo de la [facility]  Usted y eason médico pueden hablar acerca de silva opciones de [place]  Juntos pueden decidir qué tratamiento es adecuado para usted  ¿Cómo se trata la HPB? ¿Cuáles son los beneficios del tratamiento? · Conducta expectante significa que no recibirá tratamiento de inmediato   Se controlarán silva signos y síntomas con el paso del tiempo para chrissy si Rajendra   Le pueden solicitar que lleve un registro  El registro incluirá el momento en que orina, la dificultad que le presentó orinar y cualquier cambio que note en la micción  Ana Paula Joan registro con usted a las citas de control  Eason médico también puede recomendarle maneras de mejorar silva síntomas kayden la conducta expectante  · Los medicamentos pueden administrarse para ayudarlo a controlar silva síntomas y para evitar que la HPB empeore  Los medicamentos pueden ayudar a relajar ciertos músculos para hacer que orinar le resulte más fácil  También pueden necesitar medicamentos para hacer que la próstata se achique o para aliviar la hiperactividad de la vejiga  Los medicamentos pueden comenzar a aliviar los síntomas rápidamente  Estos podrían mejorar eason calidad de sung  Usted puede ser capaz de controlar silva síntomas sin cirugía si los medicamentos impiden que la HPB empeore  · Un procedimiento se puede utilizar para colocar un stent en la uretra y [place]  Un stent es un tubo de ivone corto y diminuto  Se puede utilizar un lifting uretral prostático, o UroLift, para mantener la próstata alejada de la [place] hace que la uretra sea más ancha para que la orina pueda pasar más fácilmente  · La cirugía puede realizarse para [employer] síntomas si otros tratamientos no [employer]  Sweta ablación es sweta cirugía en la que se utiliza sweta aguja para eliminar el tejido adicional que está causando los síntomas  O mary ann, puede usarse un láser para eliminar el tejido  La próstata puede calentarse  Se introduce sweta herramienta que emite calor en la uretra  Se destruye el tejido de la próstata, y ningún otro tejido sufre daños  [place] otro tipo de [place], se pueden extraer partes de la próstata  ¿Cuáles son los riesgos del tratamiento para la HPB? · Conducta expectante puede permitir que la HPB empeore y sea más difícil de tratar que en sweta etapa temprana   Si usted tiene un nivel alto de APE, es posible que deba recibir tratamiento para la HPB de inmediato  · Los medicamentos pueden provocar ciertos efectos secundarios, jeanette la disfunción eréctil o la disminución del deseo sexual  También puede desarrollar retención urinaria (problemas para comenzar a orinar)  Algunos medicamentos pueden causar hipotensión (presión sanguínea baja) cuando se pone de pie o mareos  · La cirugía puede dañar el tejido que se encuentra alrededor de la próstata  La cirugía también puede aumentar el riesgo de presentar dificultad para orinar, incontinencia (escapes) o retención [place]  Podría sangrar más de lo esperado kayden la cirugía o contraer sweta infección  También es posible que deba recibir tratamiento otra vez si la cirugía no katie silva síntomas  ¿Qué preguntas yo hacerle a mi médico para que me ayude a stephanie decisiones sobre el tratamiento? · ¿Cómo me sentiré si sigo teniendo estos síntomas kayden el miranda de mi sung? · ¿Qué medicamentos pueden trey mejores resultados para tratar mis síntomas? · ¿Qué otras medidas puedo stephanie para disminuir los síntomas? · ¿Voy a tener que stephanie medicamentos kayden el miranda de mi sung? · ¿Qué efectos secundarios podrían ocurrir con cada medicamento que puedo stephanie? · ¿Soy un buen candidato para la cirugía? · ¿Qué cirugía puede trey mejores resultados para tratar mis síntomas? · ¿Dónde se realiza la cirugía? · ¿Cuánto dura la recuperación tras la cirugía? · ¿Cuáles son Jillene Ramus? ACUERDOS SOBRE EASON CUIDADO:   Usted tiene el derecho de ayudar a planear eason cuidado  Aprenda todo lo que pueda sobre eason condición y jeanette darle tratamiento  Discuta silva opciones de tratamiento con silva médicos para decidir el cuidado que usted desea recibir  Usted siempre tiene el derecho de rechazar el tratamiento  Esta información es sólo para uso en educación   Eason intención no es darle un consejo médico sobre enfermedades o tratamientos  Colsulte con eason Jennifer Radhames farmacéutico antes de seguir cualquier régimen médico para saber si es seguro y efectivo para usted  © Copyright [address] [place] [address] Drive Information is for End User's use only and may not be sold, redistributed or otherwise used for commercial purposes   All illustrations and images included in CareNotes® are the copyrighted property of A D A M , Inc  or [address] [place] [address]

## 2021-05-10 NOTE — PROGRESS NOTES
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    Relevant Medications    finasteride (PROSCAR) 5 mg tablet    Silodosin 4 MG CAPS       Other    Pelvic pain    Person consulting for explanation of examination or test finding            Discussion:    I had a productive visit with the patient and his female  today  I counseled them on TURP, Uro lift, in simple prostatectomy  His cystoscopy does show lateral lobe hypertrophy with invasion of the prostate into the lumen of the bladder, more on the left relative to the right, this may make a Uro lift problematic in his particular case  I did give him a prescription for Rapaflo, upon attempting a transrectal ultrasound he screamed out in pain, a rectal examination shows extremely tight pelvic floor muscles, I have canceled the transrectal ultrasound due to patient intolerance, I have reschedule this with Xanax on board, this will give us the information needed to help to  the patient on either TURP or simple prostatectomy  I did tell him that pelvic pain is a difficult symptom to treat, however given that he does have obstructive urinary symptoms as well he may do better with this complaint after outlet surgery  Assessment and plan:       Please see problem oriented charting for the assessment plan of today's urological complaints      Araseli Coy MD      Chief Complaint     Chief Complaint   Patient presents with    Cystoscopy     TRUS    Benign Prostatic Hypertrophy     obstruction/luts         History of Present Illness     Kadeem Rosario is a 62 y o  Man seen previously by Dr Austin Conley for lower urinary tract symptoms, complains of intermittent stream, forcing to urinate, and pelvic pain  Also with some baseline erectile dysfunction taking tamsulosin  He has requested that we switch him back to Rapaflo which I have provided to him    He was not able to tolerate a transrectal ultrasound today due to pelvic floor spasm and pain, we will again attempt this with some Xanax on board some weeks from now  The following portions of the patient's history were reviewed and updated as appropriate: allergies, current medications, past family history, past medical history, past social history, past surgical history and problem list       Detailed Urologic History     - please refer to HPI    Review of Systems     Review of Systems   Constitutional: Negative  HENT: Negative  Eyes: Negative  Respiratory: Negative  Cardiovascular: Negative  Gastrointestinal: Negative  Endocrine: Negative  Genitourinary: Positive for difficulty urinating, frequency and urgency  And pelvic pain   Musculoskeletal: Negative  Skin: Negative  Allergic/Immunologic: Negative  Neurological: Negative  Hematological: Negative  Psychiatric/Behavioral: Negative  Allergies     No Known Allergies    Physical Exam     Physical Exam  Vitals signs reviewed  Constitutional:       General: He is not in acute distress  Appearance: Normal appearance  He is not ill-appearing, toxic-appearing or diaphoretic  HENT:      Head: Normocephalic and atraumatic  Eyes:      General: No scleral icterus  Right eye: No discharge  Left eye: No discharge  Cardiovascular:      Pulses: Normal pulses  Pulmonary:      Effort: Pulmonary effort is normal    Abdominal:      General: There is no distension  Palpations: There is no mass  Tenderness: There is no abdominal tenderness  Hernia: No hernia is present  Genitourinary:     Comments: Uncircumcised phallus, normal Ronnie stage, normal meatus, no penile plaques, normal scrotum, prostate is not palpable due to severe pelvic floor muscle spasm with pain  Musculoskeletal:         General: No swelling  Skin:     General: Skin is warm     Neurological:      General: No focal deficit present  Mental Status: He is alert and oriented to person, place, and time  Psychiatric:         Mood and Affect: Mood normal          Behavior: Behavior normal          Thought Content:  Thought content normal          Judgment: Judgment normal              Vital Signs  Vitals:    05/11/21 1405   BP: 132/88   BP Location: Left arm   Patient Position: Sitting   Cuff Size: Adult   Pulse: 70   Weight: 74 4 kg (164 lb)   Height: 5' 6" (1 676 m)         Current Medications       Current Outpatient Medications:     clobetasol (TEMOVATE) 0 05 % cream, clobetasol 0 05 % topical cream  APPLY A THIN LAYER TO THE AFFECTED AREA(S) BY TOPICAL ROUTE 2 TIMES PER DAY, Disp: , Rfl:     finasteride (PROSCAR) 5 mg tablet, Take 1 tablet (5 mg total) by mouth daily, Disp: 90 tablet, Rfl: 3    meloxicam (MOBIC) 7 5 mg tablet, meloxicam 7 5 mg tablet, Disp: , Rfl:     sildenafil (VIAGRA) 50 MG tablet, Take 2 tablets (100 mg total) by mouth daily as needed for erectile dysfunction, Disp: 20 tablet, Rfl: 3    traMADol (ULTRAM) 50 mg tablet, Every 12 hours, Disp: , Rfl:     atorvastatin (LIPITOR) 10 mg tablet, Take 10 mg by mouth NO MEDICATION LIST, Disp: , Rfl:     calcium citrate (CALCITRATE) 950 MG tablet, Take 950 mg by mouth, Disp: , Rfl:     ergocalciferol (ERGOCALCIFEROL) 64486 units capsule, Take 50,000 Units by mouth, Disp: , Rfl:     omeprazole (PRILOSEC) 20 mg delayed release capsule, Take 20 mg by mouth, Disp: , Rfl:     Silodosin 4 MG CAPS, Take 1 capsule (4 mg total) by mouth daily, Disp: 90 capsule, Rfl: 3      Active Problems     Patient Active Problem List   Diagnosis    Other male erectile dysfunction    Other specified counseling    BPH with obstruction/lower urinary tract symptoms    Pelvic pain    Person consulting for explanation of examination or test finding         Past Medical History     Past Medical History:   Diagnosis Date    BPH with obstruction/lower urinary tract symptoms  Nocturia     Nodular prostate          Surgical History     History reviewed  No pertinent surgical history  Family History     History reviewed  No pertinent family history        Social History     Social History     Social History     Tobacco Use   Smoking Status Never Smoker   Smokeless Tobacco Never Used         Pertinent Lab Values     No results found for: CREATININE    No results found for: PSA          Pertinent Imaging      no imaging for my review

## 2021-05-11 ENCOUNTER — TELEPHONE (OUTPATIENT)
Dept: UROLOGY | Facility: CLINIC | Age: 61
End: 2021-05-11

## 2021-05-11 ENCOUNTER — PROCEDURE VISIT (OUTPATIENT)
Dept: UROLOGY | Facility: CLINIC | Age: 61
End: 2021-05-11
Payer: COMMERCIAL

## 2021-05-11 VITALS
DIASTOLIC BLOOD PRESSURE: 88 MMHG | BODY MASS INDEX: 26.36 KG/M2 | WEIGHT: 164 LBS | HEART RATE: 70 BPM | SYSTOLIC BLOOD PRESSURE: 132 MMHG | HEIGHT: 66 IN

## 2021-05-11 DIAGNOSIS — R10.2 PELVIC PAIN: ICD-10-CM

## 2021-05-11 DIAGNOSIS — N40.1 BPH WITH OBSTRUCTION/LOWER URINARY TRACT SYMPTOMS: Primary | ICD-10-CM

## 2021-05-11 DIAGNOSIS — N13.8 BPH WITH OBSTRUCTION/LOWER URINARY TRACT SYMPTOMS: Primary | ICD-10-CM

## 2021-05-11 DIAGNOSIS — Z71.2 PERSON CONSULTING FOR EXPLANATION OF EXAMINATION OR TEST FINDING: ICD-10-CM

## 2021-05-11 PROCEDURE — 52000 CYSTOURETHROSCOPY: CPT | Performed by: UROLOGY

## 2021-05-11 PROCEDURE — 76872 US TRANSRECTAL: CPT | Performed by: UROLOGY

## 2021-05-11 PROCEDURE — 99214 OFFICE O/P EST MOD 30 MIN: CPT | Performed by: UROLOGY

## 2021-05-11 RX ORDER — NALOXONE HYDROCHLORIDE 4 MG/.1ML
SPRAY NASAL
Qty: 1 EACH | Refills: 1 | Status: SHIPPED | OUTPATIENT
Start: 2021-05-11 | End: 2021-09-24

## 2021-05-11 RX ORDER — ALPRAZOLAM 1 MG/1
1 TABLET ORAL ONCE
Qty: 1 TABLET | Refills: 0 | Status: SHIPPED | OUTPATIENT
Start: 2021-05-11 | End: 2021-09-24

## 2021-05-11 RX ORDER — FINASTERIDE 5 MG/1
5 TABLET, FILM COATED ORAL DAILY
Qty: 90 TABLET | Refills: 3 | Status: SHIPPED | OUTPATIENT
Start: 2021-05-11 | End: 2021-09-24 | Stop reason: SDUPTHER

## 2021-05-11 RX ORDER — SILODOSIN 4 MG/1
4 CAPSULE ORAL DAILY
Qty: 90 CAPSULE | Refills: 3 | Status: SHIPPED | OUTPATIENT
Start: 2021-05-11 | End: 2021-09-24

## 2021-05-11 NOTE — PROGRESS NOTES
Office Cystoscopy Procedure Note    Indication:     medically refractory lower urinary tract symptoms     Informed consent   The risks, benefits, complications, treatment options, and expected outcomes were discussed with the patient  The patient concurred with the proposed plan and provided informed consent  Anesthesia  Lidocaine jelly 2%    Antibiotic prophylaxis   None    Procedure  The patient was placed in the supineposition, was prepped and draped in the usual manner using sterile technique, and 2% lidocaine jelly instilled into the urethra  A 17 F flexible cystoscope was then inserted into the urethra and the urethra and bladder carefully examined  The following findings were noted:    Findings:  Urethra:  Normal urethra, no stricture, intact sphincter  Prostate:   lateral lobe hypertrophy, the left sheeba prostate in this growing into the lumen of the bladder more than right, elevation of bladder neck is noted  Bladder:   no lesions, tumors, defects, or stones, mild trabeculation is noted  Ureteral orifices:   orthotopic  Other findings:   none, retroflexed view confirms    Specimens: None                 Complications:    None; patient tolerated the procedure well           Disposition: To home            Condition: Stable    Plan:  bladder outlet obstruction is noted, patient may not do well with Uro lift given the intravesical portion of his prostate, left greater than right  No malignant findings, no stricture       Cystoscopy     Date/Time 5/11/2021 2:43 PM     Performed by  Dania Hall MD     Authorized by Dania Hall MD      Universal Protocol:  Consent: Verbal consent obtained  Written consent obtained    Risks and benefits: risks, benefits and alternatives were discussed  Consent given by: patient  Patient understanding: patient states understanding of the procedure being performed  Patient consent: the patient's understanding of the procedure matches consent given  Procedure consent: procedure consent matches procedure scheduled  Relevant documents: relevant documents present and verified  Test results: test results available and properly labeled  Site marked: the operative site was not marked  Radiology Images displayed and confirmed  If images not available, report reviewed: imaging studies available  Required items: required blood products, implants, devices, and special equipment available  Patient identity confirmed: verbally with patient and provided demographic data        Procedure Details:  Procedure type: cystoscopy    Patient tolerance: Patient tolerated the procedure well with no immediate complications    Additional Procedure Details: Office Cystoscopy Procedure Note    Indication:     medically refractory lower urinary tract symptoms     Informed consent   The risks, benefits, complications, treatment options, and expected outcomes were discussed with the patient  The patient concurred with the proposed plan and provided informed consent  Anesthesia  Lidocaine jelly 2%    Antibiotic prophylaxis   None    Procedure  The patient was placed in the supineposition, was prepped and draped in the usual manner using sterile technique, and 2% lidocaine jelly instilled into the urethra  A 17 F flexible cystoscope was then inserted into the urethra and the urethra and bladder carefully examined    The following findings were noted:    Findings:  Urethra:  Normal urethra, no stricture, intact sphincter  Prostate:   lateral lobe hypertrophy, the left sheeba prostate in this growing into the lumen of the bladder more than right, elevation of bladder neck is noted  Bladder:   no lesions, tumors, defects, or stones, mild trabeculation is noted  Ureteral orifices:   orthotopic  Other findings:   none, retroflexed view confirms    Specimens: None                 Complications:    None; patient tolerated the procedure well           Disposition: To home            Condition: Stable    Plan:  bladder outlet obstruction is noted, patient may not do well with Uro lift given the intravesical portion of his prostate, left greater than right    No malignant findings, no stricture

## 2021-05-11 NOTE — LETTER
May 11, 2021     John Mcneil  [facility] [id number]    Patient: Kadeem Rosario   YOB: 1960   Date of Visit: 5/11/2021       Dear Dr Kyler Torre: Thank you for referring Kadeem Rosario to me for evaluation  Below are my notes for this consultation  If you have questions, please do not hesitate to call me  I look forward to following your patient along with you  Sincerely,        Araseli Coy MD        CC: MD Araseli Bear MD  5/11/2021  2:46 PM  Sign when Signing Visit  Office TRUS    Indication    Medically refractory lower urinary tract symptoms    Informed consent   The risks, benefits and alternatives to TRUS obtained      Transrectal ultrasonography  The patient was placed in the left lateral decubitus position  After an attentive digital rectal examination, a 7 5 mHz sidefire ultrasound probe was attempted to be gently inserted into the rectum  However the patient had terrible muscle spasms of the pelvic floor and this would not permit the probe  I was also not able to perform a successful rectal examination due to severe pelvic muscle spasm    Bladder   unable to assess    Prostate   unable to assess        Disposition  The patient was dismissed to home     Post-procedure instructions:      we did begin today's procedure but had to terminate due to patient factors, he will be rescheduled with 1 milligram of Xanax on board 1 hour prior to this procedure          Biopsy prostate     Date/Time 5/11/2021 2:45 PM     Performed by  Araseli Coy MD     Authorized by Araseli Coy MD      Universal Protocol   Consent: Verbal consent obtained  Written consent obtained    Risks and benefits: risks, benefits and alternatives were discussed  Consent given by: patient  Patient understanding: patient states understanding of the procedure being performed  Patient consent: the patient's understanding of the procedure matches consent given  Procedure consent: procedure consent matches procedure scheduled  Relevant documents: relevant documents present and verified  Test results: test results available and properly labeled  Site marked: the operative site was not marked  Radiology Images displayed and confirmed  If images not available, report reviewed: imaging studies available  Required items: required blood products, implants, devices, and special equipment available  Patient identity confirmed: verbally with patient and provided demographic data        Local anesthesia used: no     Anesthesia   Local anesthesia used: no     Sedation   Patient sedated: no        Specimen: no    Culture: no   Procedure Details   Procedure Notes: Office TRUS    Indication    Medically refractory lower urinary tract symptoms    Informed consent   The risks, benefits and alternatives to TRUS obtained      Transrectal ultrasonography  The patient was placed in the left lateral decubitus position  After an attentive digital rectal examination, a 7 5 mHz sidefire ultrasound probe was attempted to be gently inserted into the rectum  However the patient had terrible muscle spasms of the pelvic floor and this would not permit the probe  I was also not able to perform a successful rectal examination due to severe pelvic muscle spasm    Bladder   unable to assess    Prostate   unable to assess        Disposition  The patient was dismissed to home     Post-procedure instructions:      we did begin today's procedure but had to terminate due to patient factors, he will be rescheduled with 1 milligram of Xanax on board 1 hour prior to this procedure  Patient Transportation: confirmed  Patient tolerance: patient tolerated the procedure well with no immediate complications                         Arielle Cast MD  5/11/2021  2:44 PM  Sign when Signing Visit  Office Cystoscopy Procedure Note    Indication:     medically refractory lower urinary tract symptoms     Informed consent   The risks, benefits, complications, treatment options, and expected outcomes were discussed with the patient  The patient concurred with the proposed plan and provided informed consent  Anesthesia  Lidocaine jelly 2%    Antibiotic prophylaxis   None    Procedure  The patient was placed in the supineposition, was prepped and draped in the usual manner using sterile technique, and 2% lidocaine jelly instilled into the urethra  A 17 F flexible cystoscope was then inserted into the urethra and the urethra and bladder carefully examined  The following findings were noted:    Findings:  Urethra:  Normal urethra, no stricture, intact sphincter  Prostate:   lateral lobe hypertrophy, the left sheeba prostate in this growing into the lumen of the bladder more than right, elevation of bladder neck is noted  Bladder:   no lesions, tumors, defects, or stones, mild trabeculation is noted  Ureteral orifices:   orthotopic  Other findings:   none, retroflexed view confirms    Specimens: None                 Complications:    None; patient tolerated the procedure well           Disposition: To home            Condition: Stable    Plan:  bladder outlet obstruction is noted, patient may not do well with Uro lift given the intravesical portion of his prostate, left greater than right  No malignant findings, no stricture       Cystoscopy     Date/Time 5/11/2021 2:43 PM     Performed by  Valentin Mitchell MD     Authorized by Valentin Mitchell MD      [place] Protocol:  Consent: Verbal consent obtained  Written consent obtained    Risks and benefits: risks, benefits and alternatives were discussed  Consent given by: patient  Patient understanding: patient states understanding of the procedure being performed  Patient consent: the patient's understanding of the procedure matches consent given  Procedure consent: procedure consent matches procedure scheduled  Relevant documents: relevant documents present and verified  Test results: test results available and properly labeled  Site marked: the operative site was not marked  Radiology Images displayed and confirmed  If images not available, report reviewed: imaging studies available  Required items: required blood products, implants, devices, and special equipment available  Patient identity confirmed: verbally with patient and provided demographic data        Procedure Details:  Procedure type: cystoscopy    Patient tolerance: Patient tolerated the procedure well with no immediate complications    Additional Procedure Details: Office Cystoscopy Procedure Note    Indication:     medically refractory lower urinary tract symptoms     Informed consent   The risks, benefits, complications, treatment options, and expected outcomes were discussed with the patient  The patient concurred with the proposed plan and provided informed consent  Anesthesia  Lidocaine jelly 2%    Antibiotic prophylaxis   None    Procedure  The patient was placed in the supineposition, was prepped and draped in the usual manner using sterile technique, and 2% lidocaine jelly instilled into the urethra  A 17 F flexible cystoscope was then inserted into the urethra and the urethra and bladder carefully examined    The following findings were noted:    Findings:  Urethra:  Normal urethra, no stricture, intact sphincter  Prostate:   lateral lobe hypertrophy, the left sheeba prostate in this growing into the lumen of the bladder more than right, elevation of bladder neck is noted  Bladder:   no lesions, tumors, defects, or stones, mild trabeculation is noted  Ureteral orifices:   orthotopic  Other findings:   none, retroflexed view confirms    Specimens: None                 Complications:    None; patient tolerated the procedure well           Disposition: To home            Condition: Stable    Plan:  bladder outlet obstruction is noted, patient may not do well with Uro lift given the intravesical portion of his prostate, left greater than right  No malignant findings, no stricture              Roberto Post MD  5/11/2021  2:42 PM  Sign when Signing Visit       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    Relevant Medications    finasteride (PROSCAR) 5 mg tablet    Silodosin 4 MG CAPS       Other    Pelvic pain    Person consulting for explanation of examination or test finding            Discussion:    I had a productive visit with the patient and his female  today  I counseled them on TURP, Uro lift, in simple prostatectomy  His cystoscopy does show lateral lobe hypertrophy with invasion of the prostate into the lumen of the bladder, more on the left relative to the right, this may make a Uro lift problematic in his particular case  I did give him a prescription for Rapaflo, upon attempting a transrectal ultrasound he screamed out in pain, a rectal examination shows extremely tight pelvic floor muscles, I have canceled the transrectal ultrasound due to patient intolerance, I have reschedule this with Xanax on board, this will give us the information needed to help to  the patient on either TURP or simple prostatectomy  I did tell him that pelvic pain is a difficult symptom to treat, however given that he does have obstructive urinary symptoms as well he may do better with this complaint after outlet surgery  Assessment and plan:       Please see problem oriented charting for the assessment plan of today's urological complaints      Roberto Post MD      Chief Complaint     Chief Complaint   Patient presents with    Cystoscopy     TRUS    Benign Prostatic Hypertrophy     obstruction/luts         History of Present Illness     Alexus Lorenzo is a 62 y o  Man seen previously by Dr Nikolas Betancur for lower urinary tract symptoms, complains of intermittent stream, forcing to urinate, and pelvic pain  Also with some baseline erectile dysfunction taking tamsulosin  He has requested that we switch him back to Rapaflo which I have provided to him  He was not able to tolerate a transrectal ultrasound today due to pelvic floor spasm and pain, we will again attempt this with some Xanax on board some weeks from now  The following portions of the patient's history were reviewed and updated as appropriate: allergies, current medications, past family history, past medical history, past social history, past surgical history and problem list       Detailed Urologic History     - please refer to HPI    Review of Systems     Review of Systems   Constitutional: Negative  HENT: Negative  Eyes: Negative  Respiratory: Negative  Cardiovascular: Negative  Gastrointestinal: Negative  Endocrine: Negative  Genitourinary: Positive for difficulty urinating, frequency and urgency  And pelvic pain   Musculoskeletal: Negative  Skin: Negative  Allergic/Immunologic: Negative  Neurological: Negative  Hematological: Negative  Psychiatric/Behavioral: Negative  Allergies     No Known Allergies    Physical Exam     Physical Exam  Vitals signs reviewed  Constitutional:       General: He is not in acute distress  Appearance: Normal appearance  He is not ill-appearing, toxic-appearing or diaphoretic  HENT:      Head: Normocephalic and atraumatic  Eyes:      General: No scleral icterus  Right eye: No discharge  Left eye: No discharge  Cardiovascular:      Pulses: Normal pulses  Pulmonary:      Effort: Pulmonary effort is normal    Abdominal:      General: There is no distension  Palpations: There is no mass  Tenderness: There is no abdominal tenderness  Hernia: No hernia is present     Genitourinary:     Comments: Uncircumcised phallus, normal Ronnie stage, normal meatus, no penile plaques, normal scrotum, prostate is not palpable due to severe pelvic floor muscle spasm with pain  Musculoskeletal:         General: No swelling  Skin:     General: Skin is warm  Neurological:      General: No focal deficit present  Mental Status: He is alert and oriented to person, place, and time  Psychiatric:         Mood and Affect: Mood normal          Behavior: Behavior normal          Thought Content:  Thought content normal          Judgment: Judgment normal              Vital Signs  Vitals:    05/11/21 1405   BP: 132/88   BP Location: Left arm   Patient Position: Sitting   Cuff Size: Adult   Pulse: 70   Weight: 74 4 kg (164 lb)   Height: 5' 6" (1 676 m)         Current Medications       Current Outpatient Medications:     clobetasol (TEMOVATE) 0 05 % cream, clobetasol 0 05 % topical cream  APPLY A THIN LAYER TO THE AFFECTED AREA(S) BY TOPICAL ROUTE 2 TIMES PER DAY, Disp: , Rfl:     finasteride (PROSCAR) 5 mg tablet, Take 1 tablet (5 mg total) by mouth daily, Disp: 90 tablet, Rfl: 3    meloxicam (MOBIC) 7 5 mg tablet, meloxicam 7 5 mg tablet, Disp: , Rfl:     sildenafil (VIAGRA) 50 MG tablet, Take 2 tablets (100 mg total) by mouth daily as needed for erectile dysfunction, Disp: 20 tablet, Rfl: 3    traMADol (ULTRAM) 50 mg tablet, Every 12 hours, Disp: , Rfl:     atorvastatin (LIPITOR) 10 mg tablet, Take 10 mg by mouth NO MEDICATION LIST, Disp: , Rfl:     calcium citrate (CALCITRATE) 950 MG tablet, Take 950 mg by mouth, Disp: , Rfl:     ergocalciferol (ERGOCALCIFEROL) 80878 units capsule, Take 50,000 Units by mouth, Disp: , Rfl:     omeprazole (PRILOSEC) 20 mg delayed release capsule, Take 20 mg by mouth, Disp: , Rfl:     Silodosin 4 MG CAPS, Take 1 capsule (4 mg total) by mouth daily, Disp: 90 capsule, Rfl: 3      Active Problems     Patient Active Problem List   Diagnosis    Other male erectile dysfunction    Other specified counseling    BPH with obstruction/lower urinary tract symptoms    Pelvic pain    Person consulting for explanation of examination or test finding         Past Medical History     Past Medical History:   Diagnosis Date    BPH with obstruction/lower urinary tract symptoms     Nocturia     Nodular prostate          Surgical History     History reviewed  No pertinent surgical history  Family History     History reviewed  No pertinent family history        Social History     Social History     Social History     Tobacco Use   Smoking Status Never Smoker   Smokeless Tobacco Never Used         Pertinent Lab Values     No results found for: CREATININE    No results found for: PSA          Pertinent Imaging      no imaging for my review

## 2021-05-11 NOTE — PROGRESS NOTES
Office TRUS    Indication    Medically refractory lower urinary tract symptoms    Informed consent   The risks, benefits and alternatives to TRUS obtained      Transrectal ultrasonography  The patient was placed in the left lateral decubitus position  After an attentive digital rectal examination, a 7 5 mHz sidefire ultrasound probe was attempted to be gently inserted into the rectum  However the patient had terrible muscle spasms of the pelvic floor and this would not permit the probe  I was also not able to perform a successful rectal examination due to severe pelvic muscle spasm    Bladder   unable to assess    Prostate   unable to assess        Disposition  The patient was dismissed to home     Post-procedure instructions:      we did begin today's procedure but had to terminate due to patient factors, he will be rescheduled with 1 milligram of Xanax on board 1 hour prior to this procedure          Biopsy prostate     Date/Time 5/11/2021 2:45 PM     Performed by  Davide Jade MD     Authorized by Davide Jade MD      Universal Protocol   Consent: Verbal consent obtained  Written consent obtained  Risks and benefits: risks, benefits and alternatives were discussed  Consent given by: patient  Patient understanding: patient states understanding of the procedure being performed  Patient consent: the patient's understanding of the procedure matches consent given  Procedure consent: procedure consent matches procedure scheduled  Relevant documents: relevant documents present and verified  Test results: test results available and properly labeled  Site marked: the operative site was not marked  Radiology Images displayed and confirmed   If images not available, report reviewed: imaging studies available  Required items: required blood products, implants, devices, and special equipment available  Patient identity confirmed: verbally with patient and provided demographic data        Local anesthesia used: no Anesthesia   Local anesthesia used: no     Sedation   Patient sedated: no        Specimen: no    Culture: no   Procedure Details   Procedure Notes: Office TRUS    Indication    Medically refractory lower urinary tract symptoms    Informed consent   The risks, benefits and alternatives to TRUS obtained      Transrectal ultrasonography  The patient was placed in the left lateral decubitus position  After an attentive digital rectal examination, a 7 5 mHz sidefire ultrasound probe was attempted to be gently inserted into the rectum  However the patient had terrible muscle spasms of the pelvic floor and this would not permit the probe  I was also not able to perform a successful rectal examination due to severe pelvic muscle spasm    Bladder   unable to assess    Prostate   unable to assess        Disposition  The patient was dismissed to home     Post-procedure instructions:      we did begin today's procedure but had to terminate due to patient factors, he will be rescheduled with 1 milligram of Xanax on board 1 hour prior to this procedure  Patient Transportation: confirmed  Patient tolerance: patient tolerated the procedure well with no immediate complications

## 2021-09-23 NOTE — PATIENT INSTRUCTIONS
Ayuda para la uriel de decisiones en casos de hiperplasia prostática benigna   LO QUE NECESITA SABER:   ¿Qué necesito saber acerca de la uriel de decisiones en los casos de hiperplasia prostática benigna (HPB)? Puede trabajar con eason médico para stephanie decisiones acerca de la detección o el tratamiento de la HPB  La detección es sweta prueba que se realiza para determinar la presencia de HPB de manera temprana  La detección es diferente del diagnóstico  La detección se [place] si usted está en riesgo incrementado de HPB panda no tiene síntomas  [place] implica que el control o el tratamiento pueden comenzar de Chio temprana  También puede ayudar a planificar el tratamiento si se determina la presencia de HPB en la detección o si esta se desarrolla luego  Charley opciones de tratamiento incluyen la cirugía y las opciones no quirúrgicas  Es probable que eason médico le recomiende los tratamientos no quirúrgicos starla  Conozca los beneficios y los riesgos de la [place] y del tratamiento no quirúrgico para que pueda stephanie sweta decisión informada  ¿Qué yo saber acerca de la HPB? · La HPB es un agrandamiento de la próstata  La próstata generalmente es del tamaño de Deward Marcin y envuelve la uretra  Un agrandamiento de la próstata hará que esta ejerza presión sobre la uretra  [place] puede causar problemas con el almacenamiento de la orina o la eliminación total de la orina  · La HPB es frecuente en hombres mayores de 40 años  El riesgo aumenta con la edad  · [place] significa que no es cáncer  La HPB no es sweta afección potencialmente mortal, panda puede causar problemas con charley actividades diarias  La HPB generalmente empeora con el paso del [facility]  Sin tratamiento, la HPB también puede provocar la presencia de alicia en la orina, cálculos en la vejiga o insuficiencia renal     ¿Soy un buen candidato para la detección de la HPB?  La detección puede ser útil para usted si algo de lo siguiente es verdadero:  · Tiene 36 años o más de edad     · Usted tiene antecedentes familiares de HPB u otros problemas de próstata  · Tiene sweta afección médica, jeanette sweta enfermedad cardíaca o diabetes tipo 2, o es ruthy  · No hace mucha actividad física  · Usted quiere recibir tratamiento tan pronto jeanette sea posible, si es necesario  · Uriel un medicamento betabloqueante  ¿Cómo se realiza la detección? Eason médico le preguntará sobre eason deja y silva antecedentes médicos y antecedentes familiares de problemas con la próstata  Si tiene mayor riesgo, eason médico puede usar cualquiera de los siguientes métodos para verificar la presencia de HPB:  · La Puntuación internacional de síntomas de la próstata (International Prostate Symptom Score) es un conjunto de preguntas sobre eason capacidad de orinar kayden el mes anterior  Se le preguntará con qué frecuencia presentó algo de lo que se detalla a continuación:     ? Orina 8 o más veces en un día    ? Sensación de no vaciar completamente la vejiga al orinar    ? Sweta necesidad urgente de orinar que no pudo posponer o de volver a orinar en un lapso de 2 horas    ? Despertarse debido a la necesidad de orinar    ? Problemas para comenzar a orinar o necesidad de empujar o esforzarse para comenzar a hacerlo    ? Flujo de orina que comienza y se detiene varias veces al orinar    ? [place] débil o goteo después de orinar    · Un tacto rectal se utiliza para comprobar el tamaño de la próstata  Eason médico introducirá un dedo cubierto con un guante en el recto  El médico podrá palpar la próstata  El examen se puede repetir con el paso del tiempo para verificar el tamaño de la próstata  · Un examen de APE se utiliza para determinar la cantidad de sweta proteína que la glándula prostática produce  Se uriel sweta muestra de alicia para stuart examen  Un nivel alto de APE puede aumentar el riesgo de padecer problemas más graves para orinar o la necesidad de someterse a [facility]      ¿Cuáles son Kervin Neer y los riesgos de la detección? Hable con eason médico sobre los riesgos y los beneficios de la detección:  · Los beneficios incluyen el hallazgo de la HPB de Chio temprana  [place] significa que puede stephanie [employer] tratamientos  El examen de APE también puede detectar la presencia de cáncer de próstata de manera temprana  El tratamiento para el cáncer de próstata es más exitoso cuando se comienza de Ch[place] temprana  · Los riesgos incluyen sweta falsa creencia de que no desarrollará HPB si el resultado de la detección es negativo  Aunque los resultados de la detección cory negativos, usted puede desarrollar HPB más adelante  También puede necesitar realizarse otros exámenes si tiene problemas para orinar, panda según la detección no tiene HPB  ¿Qué preguntas yo hacerle a mi médico para que me ayude a stephanie decisiones sobre la detección?  · ¿Qué riesgo presento de tener HPB? · ¿Con qué frecuencia yo realizarme la detección? · ¿Dónde se realiza la detección? · ¿Necesito hacer algo para estar listo para someterme a la detección?    ¿Qué sucede después de la detección? Usted se reunirá con eason médico para repasar los [employer] de la detección  [place] vez necesite más pruebas para diagnosticar cualquier problema que se haya observado en la detección  Las pruebas comunes incluyen un análisis de orina para detectar la presencia de infecciones o sweta biopsia (muestra de tejido) para detectar la presencia de cáncer  También puede necesitar exámenes para determinar la cantidad de orina que permanece en la vejiga después de orinar  Además, se puede medir la fuerza del flujo de la [place]  Usted y eason médico pueden hablar acerca de silva opciones de [place]  Juntos pueden decidir qué tratamiento es adecuado para usted  ¿Cómo se trata la HPB? ¿Cuáles son los beneficios del tratamiento? · Conducta expectante significa que no recibirá tratamiento de inmediato   Se controlarán silva signos y síntomas con el paso del tiempo para chrissy si Thurnell Boas  Le pueden solicitar que lleve un registro  El registro incluirá el momento en que orina, la dificultad que le presentó orinar y cualquier cambio que note en la micción  [place] Teena registro con usted a las citas de control  Eason médico también puede recomendarle maneras de mejorar silva síntomas kayden la conducta expectante  · Los medicamentos pueden administrarse para ayudarlo a controlar silva síntomas y para evitar que la HPB empeore  Los medicamentos pueden ayudar a relajar ciertos músculos para hacer que orinar le resulte más fácil  También pueden necesitar medicamentos para hacer que la próstata se achique o para aliviar la hiperactividad de la vejiga  Los medicamentos pueden comenzar a aliviar los síntomas rápidamente  Estos podrían mejorar eason calidad de sung  Usted puede ser capaz de controlar silva síntomas sin cirugía si los medicamentos impiden que la HPB empeore  · Un procedimiento se puede utilizar para colocar un stent en la uretra y [place]  Un stent es un tubo de ivone corto y diminuto  Se puede utilizar un lifting uretral prostático, o UroLift, para mantener la próstata alejada de la [place] hace que la uretra sea más ancha para que la orina pueda pasar más fácilmente  · La cirugía puede realizarse para [employer] síntomas si otros tratamientos no [employer]  Sweta ablación es sweta cirugía en la que se utiliza sweta aguja para eliminar el tejido adicional que está causando los síntomas  O mary ann, puede usarse un láser para eliminar el tejido  La próstata puede calentarse  Se introduce sweta herramienta que emite calor en la uretra  Se destruye el tejido de la próstata, y ningún otro tejido sufre daños  [place] otro tipo de [place], se pueden extraer partes de la próstata  ¿Cuáles son los riesgos del tratamiento para la HPB? · Conducta expectante puede permitir que la HPB empeore y sea más difícil de tratar que en sweta etapa temprana   Si usted tiene un nivel alto de APE, es posible que deba recibir tratamiento para la HPB de inmediato  · Los medicamentos pueden provocar ciertos efectos secundarios, jeanette la disfunción eréctil o la disminución del deseo sexual  También puede desarrollar retención urinaria (problemas para comenzar a orinar)  Algunos medicamentos pueden causar hipotensión (presión sanguínea baja) cuando se pone de pie o mareos  · La cirugía puede dañar el tejido que se encuentra alrededor de la próstata  La cirugía también puede aumentar el riesgo de presentar dificultad para orinar, incontinencia (escapes) o retención Shanna Simmers  Podría sangrar más de lo esperado kayden la cirugía o contraer sweta infección  También es posible que deba recibir tratamiento otra vez si la cirugía no katie silva síntomas  ¿Qué preguntas yo hacerle a mi médico para que me ayude a stephanie decisiones sobre el tratamiento? · ¿Cómo me sentiré si sigo teniendo estos síntomas kayden el miranda de mi sung? · ¿Qué medicamentos pueden trey mejores resultados para tratar mis síntomas? · ¿Qué otras medidas puedo stephanie para disminuir los síntomas? · ¿Voy a tener que stephanie medicamentos kayden el miranda de mi sung? · ¿Qué efectos secundarios podrían ocurrir con cada medicamento que puedo stephanie? · ¿Soy un buen candidato para la cirugía? · ¿Qué cirugía puede trey mejores resultados para tratar mis síntomas? · ¿Dónde se realiza la cirugía? · ¿Cuánto dura la recuperación tras la cirugía? · ¿Cuáles son Delesthera Barks? ACUERDOS SOBRE EASON CUIDADO:   Usted tiene el derecho de ayudar a planear eason cuidado  Aprenda todo lo que pueda sobre eason condición y jeanette darle tratamiento  Discuta silva opciones de tratamiento con silva médicos para decidir el cuidado que usted desea recibir  Usted siempre tiene el derecho de rechazar el tratamiento  Esta información es sólo para uso en educación   Eason intención no es darle un consejo médico sobre enfermedades o tratamientos  Colsulte con eason Dewain Racer farmacéutico antes de seguir cualquier régimen médico para saber si es seguro y efectivo para usted  © Copyright [facility] 2021 Information is for End User's use only and may not be sold, redistributed or otherwise used for commercial purposes   All illustrations and images included in CareNotes® are the copyrighted property of [employer]  or [address] [place] [address]

## 2021-09-23 NOTE — PROGRESS NOTES
Did the patient take the Xanax? Can book for turp or simple prostatectomy after TRUS findings are known    Office transrectal ultrasound    Indication    Medically refractory lower urinary tract symptoms    Informed consent   The risks, benefits and alternatives to TRUS-guided prostate biopsy were conveyed to the patient prior to performing the procedure  A discussion of the risks of the procedure included, but was not limited to: pain, hematuria, hematochezia, hematospermia, infection, and the possibility of a non-diagnostic biopsy  The patient was given the opportunity to have his questions answered and there was no perceived barrier to education  Antibiotic prophylaxis   The patient received the following antibiotics at least 30 minutes prior to undergoing biopsy: {Blank multiple:70180::"Ciprofloxacin 500 mg PO","Gentamicin 80 mg IM","TMP-/800 mg PO"}  The patient was instructed to continue taking the antibiotics as prescribed for a total of 3 days, including the day of biopsy  Rectal cleansing  The patient was instructed to perform an evacuating rectal enema 1-2 hours prior to biopsy  Local anesthesia  Topical 2% lidocaine jelly was applied liberally to the anus and rectum and allowed to dwell for at least 5 min prior to starting the procedure  After insertion of the TRUS probe, 10 mL of 2% lidocaine solution was injected with ultrasound guidance at the  junction of the prostate and seminal vesicles  The anesthetic was allowed to dwell for at least 2 minutes prior to biopsy  Transrectal ultrasonography  The patient was placed in the left lateral decubitus position  After an attentive digital rectal examination, a 7 5 mHz sidefire ultrasound probe was gently inserted into the rectum and biplanar imaging of the prostate was done with the findings noted below  Images were taken of any abnormal findings and also to document prostate size      Bladder  The bladder base appeared normal     Prostate  Digital rectal exam findings:  - ***    Ultrasound size measurements:  -Volume:  *** cm3    Ultrasound findings:  -Cysts: None  -Masses: None  -Median lobe: {Blank single:44115::"absent","present"}    Clinical stage (assuming a positive biopsy):   -{Blank single:64629::"T1c","T2a","T2b","T2c","T3a","T3b","T4a","T4b"}  TRUS-guided needle biopsy  Using an 18 gauge biopsy needle and ultrasound guidance, the following biopsies were taken:    1 core(s) from the left lateral base  1 core(s) from the left lateral mid-gland  1 core(s) from the right middle base  1 core(s) from the right lateral base  1 core(s) from the left lateral mid-gland  1 core(s) from the left middle mid-gland  1 core(s) from the right middle mid-gland  1 core(s) from the right lateral mid-gland  1 core(s) from the left lateral apex  1 core(s) from the left middle apex  1 core(s) from the right middle apex  1 core(s) from the right lateral apex    Total number of cores: 12                Complications  There were no procedural complications  Disposition  The patient was dismissed to home after 30 minutes of observation in stable condition  Post-procedure instructions: Today he underwent an uncomplicated transrectal ultrasound-guided biopsy of the prostate, following a periprosthetic nerve block  I reviewed the normal postprocedure a course including bleeding per rectum, hematuria, and hematospermia  I instructed him to complete his course of antibiotics as prescribed  Instructed him to call with fever greater than 101, chills, nausea, vomiting, and poorly controlled pain  His followup was scheduled in approximately 2 weeks' time to review the pathology

## 2021-09-24 ENCOUNTER — PROCEDURE VISIT (OUTPATIENT)
Dept: UROLOGY | Facility: CLINIC | Age: 61
End: 2021-09-24
Payer: COMMERCIAL

## 2021-09-24 VITALS
WEIGHT: 168 LBS | BODY MASS INDEX: 27 KG/M2 | HEIGHT: 66 IN | DIASTOLIC BLOOD PRESSURE: 88 MMHG | SYSTOLIC BLOOD PRESSURE: 148 MMHG | HEART RATE: 70 BPM

## 2021-09-24 DIAGNOSIS — N40.1 BPH WITH OBSTRUCTION/LOWER URINARY TRACT SYMPTOMS: Primary | ICD-10-CM

## 2021-09-24 DIAGNOSIS — R10.2 PELVIC PAIN: ICD-10-CM

## 2021-09-24 DIAGNOSIS — N13.8 BPH WITH OBSTRUCTION/LOWER URINARY TRACT SYMPTOMS: Primary | ICD-10-CM

## 2021-09-24 PROCEDURE — 99213 OFFICE O/P EST LOW 20 MIN: CPT | Performed by: UROLOGY

## 2021-09-24 RX ORDER — TERAZOSIN 5 MG/1
5 CAPSULE ORAL
Qty: 90 CAPSULE | Refills: 3 | Status: SHIPPED | OUTPATIENT
Start: 2021-09-24

## 2021-09-24 RX ORDER — FINASTERIDE 5 MG/1
5 TABLET, FILM COATED ORAL DAILY
Qty: 90 TABLET | Refills: 3 | Status: SHIPPED | OUTPATIENT
Start: 2021-09-24

## 2021-09-24 NOTE — PROGRESS NOTES
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    Relevant Medications    terazosin (HYTRIN) 5 mg capsule    finasteride (PROSCAR) 5 mg tablet       Other    Pelvic pain          Discussion:    Luci Strong  Could not find a  for his procedure today, as such he did not take his Xanax  I do not believe it to be in his best interest to try to place the ultrasound probe again as last time he had screening pain when trying to place this probe  It is hoped that with Xanax on board he will be able to tolerate a transrectal ultrasound  He did have trouble with coverage of Rapaflo, as such I have prescribed him Hytrin, which does appear to be covered by his insurance plan  He will continue this and finasteride until he returns for a transrectal ultrasound, following that we can book him for a Transurethral resection of prostate versus a simple prostatectomy pending prostate volume determination    Assessment and plan:       Please see problem oriented charting for the assessment plan of today's urological complaints      Sofi Alvarez MD      Chief Complaint     No chief complaint on file  History of Present Illness     Matthew Davila is a 62 y o  man with BPH and LUTS, couldn't tolerate a office TRUS at last visit  He did not take xanax prior to  Today's visit, I have refilled his medications, no new complaints  The following portions of the patient's history were reviewed and updated as appropriate: allergies, current medications, past family history, past medical history, past social history, past surgical history and problem list       Detailed Urologic History     - please refer to HPI    Review of Systems     Review of Systems   Constitutional: Negative  HENT: Negative  Eyes: Negative  Respiratory: Negative  Cardiovascular: Negative  Gastrointestinal: Negative  Endocrine: Negative  Genitourinary: Positive for difficulty urinating, frequency and urgency  Musculoskeletal: Negative  Skin: Negative  Allergic/Immunologic: Negative  Neurological: Negative  Hematological: Negative  Psychiatric/Behavioral: Negative  Allergies     No Known Allergies    Physical Exam     Physical Exam  Vitals reviewed  Constitutional:       General: He is not in acute distress  Appearance: Normal appearance  He is not ill-appearing, toxic-appearing or diaphoretic  HENT:      Head: Normocephalic and atraumatic  Eyes:      General: No scleral icterus  Right eye: No discharge  Left eye: No discharge  Cardiovascular:      Pulses: Normal pulses  Pulmonary:      Effort: Pulmonary effort is normal    Abdominal:      General: There is no distension  Palpations: There is no mass  Tenderness: There is no abdominal tenderness  Hernia: No hernia is present  Musculoskeletal:         General: No swelling  Skin:     General: Skin is warm  Neurological:      General: No focal deficit present  Mental Status: He is alert and oriented to person, place, and time  Cranial Nerves: No cranial nerve deficit  Psychiatric:         Mood and Affect: Mood normal          Behavior: Behavior normal          Thought Content:  Thought content normal          Judgment: Judgment normal              Vital Signs  Vitals:    09/24/21 1022   BP: 148/88   Pulse: 70   Weight: 76 2 kg (168 lb)   Height: 5' 6" (1 676 m)         Current Medications       Current Outpatient Medications:     finasteride (PROSCAR) 5 mg tablet, Take 1 tablet (5 mg total) by mouth daily, Disp: 90 tablet, Rfl: 3    meloxicam (MOBIC) 7 5 mg tablet, meloxicam 7 5 mg tablet, Disp: , Rfl:     sildenafil (VIAGRA) 50 MG tablet, Take 2 tablets (100 mg total) by mouth daily as needed for erectile dysfunction, Disp: 20 tablet, Rfl: 3    ALPRAZolam (XANAX) 1 mg tablet, Take 1 tablet (1 mg total) by mouth once for 1 dose 1 hour prior to  Transrectal ultrasound, Disp: 1 tablet, Rfl: 0    clobetasol (TEMOVATE) 0 05 % cream, clobetasol 0 05 % topical cream  APPLY A THIN LAYER TO THE AFFECTED AREA(S) BY TOPICAL ROUTE 2 TIMES PER DAY, Disp: , Rfl:     ergocalciferol (ERGOCALCIFEROL) 39116 units capsule, Take 50,000 Units by mouth, Disp: , Rfl:     omeprazole (PRILOSEC) 20 mg delayed release capsule, Take 20 mg by mouth, Disp: , Rfl:     terazosin (HYTRIN) 5 mg capsule, Take 1 capsule (5 mg total) by mouth daily at bedtime, Disp: 90 capsule, Rfl: 3      Active Problems     Patient Active Problem List   Diagnosis    Other male erectile dysfunction    Other specified counseling    BPH with obstruction/lower urinary tract symptoms    Pelvic pain    Person consulting for explanation of examination or test finding         Past Medical History     Past Medical History:   Diagnosis Date    BPH with obstruction/lower urinary tract symptoms     Nocturia     Nodular prostate          Surgical History     History reviewed  No pertinent surgical history  Family History     History reviewed  No pertinent family history        Social History     Social History     Social History     Tobacco Use   Smoking Status Never Smoker   Smokeless Tobacco Never Used         Pertinent Lab Values     No results found for: CREATININE    No results found for: PSA          Pertinent Imaging      no new imaging for my review

## 2021-09-24 NOTE — LETTER
September 24, 2021     MD Tomás Mijares [address]  [facility] [address]    Patient: Olga Lidia Rashid   YOB: 1960   Date of Visit: 9/24/2021       Dear Dr Antoinette Zee: Thank you for referring Olga Lidia Rashid to me for evaluation  Below are my notes for this consultation  If you have questions, please do not hesitate to call me  I look forward to following your patient along with you  Sincerely,        Melina Shane MD        CC: No Recipients  Melina Shane MD  9/24/2021 10:48 AM  Sign when Signing Visit       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    Relevant Medications    terazosin (HYTRIN) 5 mg capsule    finasteride (PROSCAR) 5 mg tablet       Other    Pelvic pain          Discussion:    Mandi Yanez  Could not find a  for his procedure today, as such he did not take his Xanax  I do not believe it to be in his best interest to try to place the ultrasound probe again as last time he had screening pain when trying to place this probe  It is hoped that with Xanax on board he will be able to tolerate a transrectal ultrasound  He did have trouble with coverage of Rapaflo, as such I have prescribed him Hytrin, which does appear to be covered by his insurance plan  He will continue this and finasteride until he returns for a transrectal ultrasound, following that we can book him for a Transurethral resection of prostate versus a simple prostatectomy pending prostate volume determination    Assessment and plan:       Please see problem oriented charting for the assessment plan of today's urological complaints      Melina Shane MD      Chief Complaint     No chief complaint on file  History of Present Illness     Olga Lidia Rashid is a 62 y o  man with BPH and LUTS, couldn't tolerate a office TRUS at last visit   He did not take xanax prior to  Today's visit, I have refilled his medications, no new complaints  The following portions of the patient's history were reviewed and updated as appropriate: allergies, current medications, past family history, past medical history, past social history, past surgical history and problem list       Detailed Urologic History     - please refer to HPI    Review of Systems     Review of Systems   Constitutional: Negative  HENT: Negative  Eyes: Negative  Respiratory: Negative  Cardiovascular: Negative  Gastrointestinal: Negative  Endocrine: Negative  Genitourinary: Positive for difficulty urinating, frequency and urgency  Musculoskeletal: Negative  Skin: Negative  Allergic/Immunologic: Negative  Neurological: Negative  Hematological: Negative  Psychiatric/Behavioral: Negative  Allergies     No Known Allergies    Physical Exam     Physical Exam  Vitals reviewed  Constitutional:       General: He is not in acute distress  Appearance: Normal appearance  He is not ill-appearing, toxic-appearing or diaphoretic  HENT:      Head: Normocephalic and atraumatic  Eyes:      General: No scleral icterus  Right eye: No discharge  Left eye: No discharge  Cardiovascular:      Pulses: Normal pulses  Pulmonary:      Effort: Pulmonary effort is normal    Abdominal:      General: There is no distension  Palpations: There is no mass  Tenderness: There is no abdominal tenderness  Hernia: No hernia is present  Musculoskeletal:         General: No swelling  Skin:     General: Skin is warm  Neurological:      General: No focal deficit present  Mental Status: He is alert and oriented to person, place, and time  Cranial Nerves: No cranial nerve deficit  Psychiatric:         Mood and Affect: Mood normal          Behavior: Behavior normal          Thought Content:  Thought content normal          Judgment: Judgment normal              Vital Signs  Vitals:    09/24/21 1022 BP: 148/88   Pulse: 70   Weight: 76 2 kg (168 lb)   Height: 5' 6" (1 676 m)         Current Medications       Current Outpatient Medications:     finasteride (PROSCAR) 5 mg tablet, Take 1 tablet (5 mg total) by mouth daily, Disp: 90 tablet, Rfl: 3    meloxicam (MOBIC) 7 5 mg tablet, meloxicam 7 5 mg tablet, Disp: , Rfl:     sildenafil (VIAGRA) 50 MG tablet, Take 2 tablets (100 mg total) by mouth daily as needed for erectile dysfunction, Disp: 20 tablet, Rfl: 3    ALPRAZolam (XANAX) 1 mg tablet, Take 1 tablet (1 mg total) by mouth once for 1 dose 1 hour prior to  Transrectal ultrasound, Disp: 1 tablet, Rfl: 0    clobetasol (TEMOVATE) 0 05 % cream, clobetasol 0 05 % topical cream  APPLY A THIN LAYER TO THE AFFECTED AREA(S) BY TOPICAL ROUTE 2 TIMES PER DAY, Disp: , Rfl:     ergocalciferol (ERGOCALCIFEROL) 73213 units capsule, Take 50,000 Units by mouth, Disp: , Rfl:     omeprazole (PRILOSEC) 20 mg delayed release capsule, Take 20 mg by mouth, Disp: , Rfl:     terazosin (HYTRIN) 5 mg capsule, Take 1 capsule (5 mg total) by mouth daily at bedtime, Disp: 90 capsule, Rfl: 3      Active Problems     Patient Active Problem List   Diagnosis    Other male erectile dysfunction    Other specified counseling    BPH with obstruction/lower urinary tract symptoms    Pelvic pain    Person consulting for explanation of examination or test finding         Past Medical History     Past Medical History:   Diagnosis Date    BPH with obstruction/lower urinary tract symptoms     Nocturia     Nodular prostate          Surgical History     History reviewed  No pertinent surgical history  Family History     History reviewed  No pertinent family history        Social History     Social History     Social History     Tobacco Use   Smoking Status Never Smoker   Smokeless Tobacco Never Used         Pertinent Lab Values     No results found for: CREATININE    No results found for: PSA          Pertinent Imaging      no new imaging for my review

## 2021-11-09 ENCOUNTER — TELEPHONE (OUTPATIENT)
Dept: UROLOGY | Facility: CLINIC | Age: 61
End: 2021-11-09

## 2022-08-17 DIAGNOSIS — N40.1 BPH WITH OBSTRUCTION/LOWER URINARY TRACT SYMPTOMS: ICD-10-CM

## 2022-08-17 DIAGNOSIS — N13.8 BPH WITH OBSTRUCTION/LOWER URINARY TRACT SYMPTOMS: ICD-10-CM

## 2022-08-17 RX ORDER — TERAZOSIN 5 MG/1
5 CAPSULE ORAL
Qty: 90 CAPSULE | Refills: 2 | Status: SHIPPED | OUTPATIENT
Start: 2022-08-17

## 2022-09-01 ENCOUNTER — TELEPHONE (OUTPATIENT)
Dept: OTHER | Facility: OTHER | Age: 62
End: 2022-09-01

## 2022-09-01 NOTE — TELEPHONE ENCOUNTER
Patient called regarding his medication terazosin; advised patient it was sent on 8/17 with refills; asked patient to contact the pharmacy and call back if he has any problems getting refills

## 2023-06-09 ENCOUNTER — TELEPHONE (OUTPATIENT)
Dept: UROLOGY | Facility: AMBULATORY SURGERY CENTER | Age: 63
End: 2023-06-09

## 2023-06-09 DIAGNOSIS — N40.1 BPH WITH OBSTRUCTION/LOWER URINARY TRACT SYMPTOMS: ICD-10-CM

## 2023-06-09 DIAGNOSIS — N13.8 BPH WITH OBSTRUCTION/LOWER URINARY TRACT SYMPTOMS: ICD-10-CM

## 2023-06-09 RX ORDER — TERAZOSIN 5 MG/1
5 CAPSULE ORAL
Qty: 90 CAPSULE | Refills: 0 | Status: SHIPPED | OUTPATIENT
Start: 2023-06-09 | End: 2023-06-16

## 2023-06-09 RX ORDER — TERAZOSIN 5 MG/1
5 CAPSULE ORAL
Qty: 90 CAPSULE | Refills: 3 | Status: SHIPPED | OUTPATIENT
Start: 2023-06-09 | End: 2023-06-09 | Stop reason: SDUPTHER

## 2023-06-09 NOTE — TELEPHONE ENCOUNTER
Patient is calling to request a prescription refill  Patient stated he is completely out     Last seen by: on 9/24/21 Dr Scott Keene in [facility] [place]    Medication: Terazosin 5 mg    Pharmacy: [facility] in [place]     30 / 90 day supply:  90 day     Pt can be reached at: [phone]    Informed patient he is past due for a follow up and scheduled him on 6/16 with Devante Perez in [facility] due to being closer for the patient

## 2023-06-13 RX ORDER — LEVOTHYROXINE SODIUM 0.03 MG/1
TABLET ORAL
COMMUNITY
Start: 2023-03-17

## 2023-06-16 ENCOUNTER — OFFICE VISIT (OUTPATIENT)
Dept: UROLOGY | Facility: MEDICAL CENTER | Age: 63
End: 2023-06-16
Payer: COMMERCIAL

## 2023-06-16 VITALS
OXYGEN SATURATION: 99 % | WEIGHT: 167 LBS | HEIGHT: 64 IN | SYSTOLIC BLOOD PRESSURE: 120 MMHG | HEART RATE: 72 BPM | DIASTOLIC BLOOD PRESSURE: 70 MMHG | BODY MASS INDEX: 28.51 KG/M2

## 2023-06-16 DIAGNOSIS — N52.9 ERECTILE DYSFUNCTION, UNSPECIFIED ERECTILE DYSFUNCTION TYPE: ICD-10-CM

## 2023-06-16 DIAGNOSIS — N40.1 BPH WITH OBSTRUCTION/LOWER URINARY TRACT SYMPTOMS: Primary | ICD-10-CM

## 2023-06-16 DIAGNOSIS — Z12.5 PROSTATE CANCER SCREENING: ICD-10-CM

## 2023-06-16 DIAGNOSIS — N13.8 BPH WITH OBSTRUCTION/LOWER URINARY TRACT SYMPTOMS: Primary | ICD-10-CM

## 2023-06-16 LAB
POST-VOID RESIDUAL VOLUME, ML POC: 12 ML
SL AMB  POCT GLUCOSE, UA: NORMAL
SL AMB LEUKOCYTE ESTERASE,UA: 5.5
SL AMB POCT BILIRUBIN,UA: NORMAL
SL AMB POCT BLOOD,UA: NORMAL
SL AMB POCT CLARITY,UA: CLEAR
SL AMB POCT COLOR,UA: YELLOW
SL AMB POCT KETONES,UA: NORMAL
SL AMB POCT NITRITE,UA: NORMAL
SL AMB POCT PH,UA: 1
SL AMB POCT SPECIFIC GRAVITY,UA: 1
SL AMB POCT URINE PROTEIN: NORMAL
SL AMB POCT UROBILINOGEN: 0.2

## 2023-06-16 PROCEDURE — 99214 OFFICE O/P EST MOD 30 MIN: CPT | Performed by: PHYSICIAN ASSISTANT

## 2023-06-16 PROCEDURE — 81003 URINALYSIS AUTO W/O SCOPE: CPT | Performed by: PHYSICIAN ASSISTANT

## 2023-06-16 PROCEDURE — 51798 US URINE CAPACITY MEASURE: CPT | Performed by: PHYSICIAN ASSISTANT

## 2023-06-16 RX ORDER — SILDENAFIL 50 MG/1
TABLET, FILM COATED ORAL
Qty: 30 TABLET | Refills: 3 | Status: SHIPPED | OUTPATIENT
Start: 2023-06-16

## 2023-06-16 RX ORDER — FINASTERIDE 5 MG/1
5 TABLET, FILM COATED ORAL DAILY
Qty: 90 TABLET | Refills: 3 | Status: SHIPPED | OUTPATIENT
Start: 2023-06-16

## 2023-06-16 RX ORDER — ALFUZOSIN HYDROCHLORIDE 10 MG/1
10 TABLET, EXTENDED RELEASE ORAL DAILY
Qty: 30 TABLET | Refills: 3 | Status: SHIPPED | OUTPATIENT
Start: 2023-06-16

## 2023-06-16 NOTE — PROGRESS NOTES
6/16/2023      Chief Complaint   Patient presents with   • Benign Prostatic Hypertrophy   • Medication Refill         Assessment and Plan    59 y o  male managed by our office     1  BPH with LUTS  · Urine today: negative   · PVR: 12 mL   · AUA score: 15   · Finasteride and alfuzosin sent to patient's pharmacy  · Follow up in 3 months to re-evaluate urinary symptoms  Patient wishes to try combination therapy again before proceeding with repeat surgical work up  2  Prostate cancer screening   · PSA 2020 - 2 8  · PSA ordered for next visit  MARY to be performed at next visit  History of Present Illness  Alexsandra Rollins is a 59 y o  male here for evaluation of BPH  Patient is s/p office cystoscopy which revealed lateral lobe hypertrophy, left > right  He was unable to tolerate TRUS on multiple attempts because he could not take the Xanax prior given lack of a ride  Plan was to continue combination therapy until he could come back with a  to attempt his TRUS  Patient states he is currently satisfied with his urinary status but has not been taking the finasteride and wishes to try alternative to his Hytrin  It looks like there has been issues with his insurance coverage in the past  He was initially on Flomax but then tried Rapaflo which was not covered  Dr Katheryn Rincon had started him on Hytrin 5 mg po daily  Patient reports nocturia twice per night and occasionally experiencing intermittent stream's  He denies a sensation of incomplete bladder emptying  He denies daytime urinary symptoms of straining to void, frequency, urgency, or dysuria  He denies any gross hematuria  He denies any issues with urinary tract infections or inability to void  He denies any flank or abdominal pain  His last PSA testing was in 2020 which was 2 8  Review of Systems   Constitutional: Negative for chills and fever  HENT: Negative  Respiratory: Negative  Cardiovascular: Negative      Gastrointestinal: Negative "for abdominal pain, nausea and vomiting  Genitourinary: Negative for difficulty urinating, dysuria, frequency, hematuria and urgency  Nocturia 2x per night  Does report having intermittent stream at times   Denies sensation of incomplete bladder emptying   Musculoskeletal: Negative  Skin: Negative  Neurological: Negative  AUA SYMPTOM SCORE    Flowsheet Row Most Recent Value   AUA SYMPTOM SCORE    How often have you had a sensation of not emptying your bladder completely after you finished urinating? 3   How often have you had to urinate again less than two hours after you finished urinating? 2   How often have you found you stopped and started again several times when you urinate? 2   How often have you found it difficult to postpone urination? 2   How often have you had a weak urinary stream? 2   How often have you had to push or strain to begin urination? 1   How many times did you most typically get up to urinate from the time you went to bed at night until the time you got up in the morning? 3   Quality of Life: If you were to spend the rest of your life with your urinary condition just the way it is now, how would you feel about that? 3   AUA SYMPTOM SCORE 15           Vitals  Vitals:    06/16/23 0801   BP: 120/70   BP Location: Left arm   Patient Position: Sitting   Cuff Size: Large   Pulse: 72   SpO2: 99%   Weight: 75 8 kg (167 lb)   Height: 5' 4\" (1 626 m)       Physical Exam  Vitals reviewed  Constitutional:       General: He is not in acute distress  Appearance: Normal appearance  He is not ill-appearing, toxic-appearing or diaphoretic  HENT:      Head: Normocephalic and atraumatic  Eyes:      Conjunctiva/sclera: Conjunctivae normal    Pulmonary:      Effort: Pulmonary effort is normal  No respiratory distress  Abdominal:      General: There is no distension  Palpations: Abdomen is soft  Tenderness: There is no abdominal tenderness   There is no right CVA " tenderness, left CVA tenderness, guarding or rebound  Musculoskeletal:         General: Normal range of motion  Cervical back: Normal range of motion  Skin:     General: Skin is warm and dry  Neurological:      General: No focal deficit present  Mental Status: He is alert and oriented to person, place, and time  Psychiatric:         Mood and Affect: Mood normal          Behavior: Behavior normal          Thought Content: Thought content normal          Judgment: Judgment normal        Past History  Past Medical History:   Diagnosis Date   • BPH with obstruction/lower urinary tract symptoms    • Nocturia    • Nodular prostate      Social History     Socioeconomic History   • Marital status: /Civil Union     Spouse name: None   • Number of children: None   • Years of education: None   • Highest education level: None   Occupational History   • None   Tobacco Use   • Smoking status: Never   • Smokeless tobacco: Never   Substance and Sexual Activity   • Alcohol use: No   • Drug use: No   • Sexual activity: None   Other Topics Concern   • None   Social History Narrative   • None     Social Determinants of Health     Financial Resource Strain: Not on file   Food Insecurity: Not on file   Transportation Needs: Not on file   Physical Activity: Not on file   Stress: Not on file   Social Connections: Not on file   Intimate Partner Violence: Not on file   Housing Stability: Not on file     Social History     Tobacco Use   Smoking Status Never   Smokeless Tobacco Never     No family history on file      The following portions of the patient's history were reviewed and updated as appropriate: allergies, current medications, past medical history, past social history, past surgical history and problem list     Results  Recent Results (from the past 1 hour(s))   POCT Measure PVR    Collection Time: 06/16/23  8:13 AM   Result Value Ref Range    POST-VOID RESIDUAL VOLUME, ML POC 12 mL   POCT urine dip auto "non-scope    Collection Time: 06/16/23  8:13 AM   Result Value Ref Range     COLOR,UA yellow     CLARITY,UA clear     SPECIFIC GRAVITY,UA 1      PH,UA 1 005     LEUKOCYTE ESTERASE,UA 5 5     NITRITE,UA neg     GLUCOSE, UA neg     KETONES,UA neg     BILIRUBIN,UA neg     BLOOD,UA neg     POCT URINE PROTEIN neg     SL AMB POCT UROBILINOGEN 0 2    ]  No results found for: \"PSA\"  No results found for: \"GLUCOSE\", \"CALCIUM\", \"NA\", \"K\", \"CO2\", \"CL\", \"BUN\", \"CREATININE\"  No results found for: \"WBC\", \"HGB\", \"HCT\", \"MCV\", \"PLT\"    Brianna Dougherty PA-C    "

## 2023-09-27 ENCOUNTER — TELEPHONE (OUTPATIENT)
Dept: UROLOGY | Facility: MEDICAL CENTER | Age: 63
End: 2023-09-27

## 2023-09-27 NOTE — TELEPHONE ENCOUNTER
Call placed -LVM on pt's cell as per communication consent. Reminded pt to have PSA done prior to OV.

## 2024-06-25 DIAGNOSIS — N13.8 BPH WITH OBSTRUCTION/LOWER URINARY TRACT SYMPTOMS: ICD-10-CM

## 2024-06-25 DIAGNOSIS — N40.1 BPH WITH OBSTRUCTION/LOWER URINARY TRACT SYMPTOMS: ICD-10-CM

## 2024-06-26 RX ORDER — TERAZOSIN 5 MG/1
5 CAPSULE ORAL
Qty: 90 CAPSULE | Refills: 2 | OUTPATIENT
Start: 2024-06-26

## 2024-07-01 DIAGNOSIS — N13.8 BPH WITH OBSTRUCTION/LOWER URINARY TRACT SYMPTOMS: ICD-10-CM

## 2024-07-01 DIAGNOSIS — N40.1 BPH WITH OBSTRUCTION/LOWER URINARY TRACT SYMPTOMS: ICD-10-CM

## 2024-07-02 RX ORDER — FINASTERIDE 5 MG/1
5 TABLET, FILM COATED ORAL DAILY
Qty: 90 TABLET | Refills: 3 | Status: SHIPPED | OUTPATIENT
Start: 2024-07-02

## 2025-02-20 DIAGNOSIS — N13.8 BPH WITH OBSTRUCTION/LOWER URINARY TRACT SYMPTOMS: ICD-10-CM

## 2025-02-20 DIAGNOSIS — N40.1 BPH WITH OBSTRUCTION/LOWER URINARY TRACT SYMPTOMS: ICD-10-CM

## 2025-02-20 RX ORDER — TERAZOSIN 5 MG/1
5 CAPSULE ORAL
Qty: 90 CAPSULE | Refills: 1 | Status: SHIPPED | OUTPATIENT
Start: 2025-02-20